# Patient Record
Sex: MALE | Race: BLACK OR AFRICAN AMERICAN | NOT HISPANIC OR LATINO | Employment: FULL TIME | URBAN - METROPOLITAN AREA
[De-identification: names, ages, dates, MRNs, and addresses within clinical notes are randomized per-mention and may not be internally consistent; named-entity substitution may affect disease eponyms.]

---

## 2023-05-31 DIAGNOSIS — I26.99 PULMONARY EMBOLISM, UNSPECIFIED CHRONICITY, UNSPECIFIED PULMONARY EMBOLISM TYPE, UNSPECIFIED WHETHER ACUTE COR PULMONALE PRESENT (MULTI): ICD-10-CM

## 2023-05-31 DIAGNOSIS — N40.1 BENIGN PROSTATIC HYPERPLASIA WITH LOWER URINARY TRACT SYMPTOMS, SYMPTOM DETAILS UNSPECIFIED: ICD-10-CM

## 2023-05-31 PROBLEM — N40.0 BPH (BENIGN PROSTATIC HYPERPLASIA): Status: ACTIVE | Noted: 2023-05-31

## 2023-05-31 RX ORDER — TAMSULOSIN HYDROCHLORIDE 0.4 MG/1
0.4 CAPSULE ORAL NIGHTLY
COMMUNITY
End: 2023-05-31 | Stop reason: SDUPTHER

## 2023-05-31 RX ORDER — RIVAROXABAN 10 MG/1
10 TABLET, FILM COATED ORAL DAILY
COMMUNITY
End: 2023-05-31 | Stop reason: SDUPTHER

## 2023-06-01 RX ORDER — RIVAROXABAN 10 MG/1
10 TABLET, FILM COATED ORAL DAILY
Qty: 30 TABLET | Refills: 0 | Status: SHIPPED | OUTPATIENT
Start: 2023-06-01 | End: 2023-07-18 | Stop reason: SDUPTHER

## 2023-06-01 RX ORDER — TAMSULOSIN HYDROCHLORIDE 0.4 MG/1
0.4 CAPSULE ORAL NIGHTLY
Qty: 30 CAPSULE | Refills: 0 | Status: SHIPPED | OUTPATIENT
Start: 2023-06-01 | End: 2023-07-18 | Stop reason: SDUPTHER

## 2023-07-18 ENCOUNTER — OFFICE VISIT (OUTPATIENT)
Dept: PRIMARY CARE | Facility: CLINIC | Age: 63
End: 2023-07-18
Payer: COMMERCIAL

## 2023-07-18 VITALS
DIASTOLIC BLOOD PRESSURE: 87 MMHG | HEART RATE: 74 BPM | RESPIRATION RATE: 18 BRPM | HEIGHT: 66 IN | WEIGHT: 171.4 LBS | SYSTOLIC BLOOD PRESSURE: 126 MMHG | OXYGEN SATURATION: 99 % | BODY MASS INDEX: 27.55 KG/M2

## 2023-07-18 DIAGNOSIS — E88.810 DYSMETABOLIC SYNDROME: ICD-10-CM

## 2023-07-18 DIAGNOSIS — I10 ESSENTIAL HYPERTENSION: ICD-10-CM

## 2023-07-18 DIAGNOSIS — E55.9 VITAMIN D DEFICIENCY: ICD-10-CM

## 2023-07-18 DIAGNOSIS — Z87.891 FORMER SMOKER: ICD-10-CM

## 2023-07-18 DIAGNOSIS — I26.99 PULMONARY EMBOLISM, UNSPECIFIED CHRONICITY, UNSPECIFIED PULMONARY EMBOLISM TYPE, UNSPECIFIED WHETHER ACUTE COR PULMONALE PRESENT (MULTI): Primary | ICD-10-CM

## 2023-07-18 DIAGNOSIS — R73.03 PREDIABETES: ICD-10-CM

## 2023-07-18 DIAGNOSIS — N40.0 BENIGN PROSTATIC HYPERPLASIA WITHOUT LOWER URINARY TRACT SYMPTOMS: ICD-10-CM

## 2023-07-18 DIAGNOSIS — E78.5 DYSLIPIDEMIA: ICD-10-CM

## 2023-07-18 DIAGNOSIS — N40.1 BENIGN PROSTATIC HYPERPLASIA WITH LOWER URINARY TRACT SYMPTOMS, SYMPTOM DETAILS UNSPECIFIED: ICD-10-CM

## 2023-07-18 DIAGNOSIS — R91.1 LUNG NODULE: ICD-10-CM

## 2023-07-18 PROBLEM — M19.012 ARTHRITIS OF SHOULDER REGION, LEFT: Status: ACTIVE | Noted: 2023-07-18

## 2023-07-18 PROBLEM — M51.369 LUMBAR DEGENERATIVE DISC DISEASE: Status: ACTIVE | Noted: 2023-07-18

## 2023-07-18 PROBLEM — N52.9 INABILITY TO ATTAIN ERECTION: Status: ACTIVE | Noted: 2023-07-18

## 2023-07-18 PROBLEM — R01.1 CARDIAC MURMUR: Status: ACTIVE | Noted: 2023-07-18

## 2023-07-18 PROBLEM — R07.89 ATYPICAL CHEST PAIN: Status: RESOLVED | Noted: 2023-07-18 | Resolved: 2023-07-18

## 2023-07-18 PROBLEM — M54.30 SCIATICA: Status: ACTIVE | Noted: 2023-07-18

## 2023-07-18 PROBLEM — N40.2 PROSTATE NODULE: Status: ACTIVE | Noted: 2023-07-18

## 2023-07-18 PROBLEM — F17.210 NICOTINE DEPENDENCE, CIGARETTES, UNCOMPLICATED: Status: RESOLVED | Noted: 2023-07-18 | Resolved: 2023-07-18

## 2023-07-18 PROBLEM — K92.1 BLACK STOOL: Status: RESOLVED | Noted: 2023-07-18 | Resolved: 2023-07-18

## 2023-07-18 PROBLEM — E04.9 GOITER: Status: ACTIVE | Noted: 2023-07-18

## 2023-07-18 PROBLEM — M75.81 TENDINITIS OF RIGHT ROTATOR CUFF: Status: RESOLVED | Noted: 2023-07-18 | Resolved: 2023-07-18

## 2023-07-18 PROBLEM — K59.00 CONSTIPATION: Status: RESOLVED | Noted: 2023-07-18 | Resolved: 2023-07-18

## 2023-07-18 PROBLEM — M51.36 LUMBAR DEGENERATIVE DISC DISEASE: Status: ACTIVE | Noted: 2023-07-18

## 2023-07-18 PROCEDURE — 3074F SYST BP LT 130 MM HG: CPT

## 2023-07-18 PROCEDURE — 3079F DIAST BP 80-89 MM HG: CPT

## 2023-07-18 PROCEDURE — 1036F TOBACCO NON-USER: CPT

## 2023-07-18 PROCEDURE — 99214 OFFICE O/P EST MOD 30 MIN: CPT

## 2023-07-18 RX ORDER — AMLODIPINE BESYLATE 5 MG/1
5 TABLET ORAL DAILY
Qty: 30 TABLET | Refills: 3 | Status: SHIPPED | OUTPATIENT
Start: 2023-07-18 | End: 2023-12-14 | Stop reason: SDUPTHER

## 2023-07-18 RX ORDER — ASPIRIN 325 MG
50000 TABLET, DELAYED RELEASE (ENTERIC COATED) ORAL
COMMUNITY

## 2023-07-18 RX ORDER — AMLODIPINE BESYLATE 5 MG/1
5 TABLET ORAL DAILY
COMMUNITY
End: 2023-07-18 | Stop reason: SDUPTHER

## 2023-07-18 RX ORDER — OLMESARTAN MEDOXOMIL 40 MG/1
40 TABLET ORAL DAILY
Qty: 30 TABLET | Refills: 3 | Status: SHIPPED | OUTPATIENT
Start: 2023-07-18 | End: 2024-05-23 | Stop reason: SDUPTHER

## 2023-07-18 RX ORDER — OLMESARTAN MEDOXOMIL 40 MG/1
40 TABLET ORAL DAILY
COMMUNITY
End: 2023-07-18 | Stop reason: SDUPTHER

## 2023-07-18 RX ORDER — TAMSULOSIN HYDROCHLORIDE 0.4 MG/1
0.4 CAPSULE ORAL NIGHTLY
Qty: 30 CAPSULE | Refills: 3 | Status: SHIPPED | OUTPATIENT
Start: 2023-07-18 | End: 2023-11-16 | Stop reason: SDUPTHER

## 2023-07-18 RX ORDER — RIVAROXABAN 10 MG/1
10 TABLET, FILM COATED ORAL DAILY
Qty: 30 TABLET | Refills: 3 | Status: SHIPPED | OUTPATIENT
Start: 2023-07-18 | End: 2023-10-19 | Stop reason: SDUPTHER

## 2023-07-18 ASSESSMENT — ENCOUNTER SYMPTOMS
DIZZINESS: 0
EYE DISCHARGE: 0
SORE THROAT: 0
DEPRESSION: 0
WEAKNESS: 0
GASTROINTESTINAL NEGATIVE: 1
CONSTITUTIONAL NEGATIVE: 1
PSYCHIATRIC NEGATIVE: 1
CARDIOVASCULAR NEGATIVE: 1
FATIGUE: 0
TREMORS: 0
JOINT SWELLING: 0
CHILLS: 0
DIFFICULTY URINATING: 0
POLYPHAGIA: 0
BRUISES/BLEEDS EASILY: 0
NECK STIFFNESS: 0
SINUS PRESSURE: 0
FEVER: 0
POLYDIPSIA: 0
CONFUSION: 0
OCCASIONAL FEELINGS OF UNSTEADINESS: 0
NECK PAIN: 0
DIARRHEA: 0
STRIDOR: 0
CHEST TIGHTNESS: 0
RESPIRATORY NEGATIVE: 1
SLEEP DISTURBANCE: 0
RECTAL PAIN: 0
BACK PAIN: 0
SPEECH DIFFICULTY: 0
PALPITATIONS: 0
LIGHT-HEADEDNESS: 0
CONSTIPATION: 0
HEMATOLOGIC/LYMPHATIC NEGATIVE: 1
NERVOUS/ANXIOUS: 0
NUMBNESS: 0
SHORTNESS OF BREATH: 0
DYSPHORIC MOOD: 0
APPETITE CHANGE: 0
TROUBLE SWALLOWING: 0
ANAL BLEEDING: 0
COUGH: 0
NEUROLOGICAL NEGATIVE: 1
ACTIVITY CHANGE: 0
LOSS OF SENSATION IN FEET: 0
EYES NEGATIVE: 1
HEADACHES: 0
UNEXPECTED WEIGHT CHANGE: 0
HYPERACTIVE: 0
COLOR CHANGE: 0
FLANK PAIN: 0
ENDOCRINE NEGATIVE: 1
PHOTOPHOBIA: 0
RHINORRHEA: 0
WHEEZING: 0
SEIZURES: 0
HEMATURIA: 0
BLOOD IN STOOL: 0
MYALGIAS: 0
VOICE CHANGE: 0
DIAPHORESIS: 0
MUSCULOSKELETAL NEGATIVE: 1
NAUSEA: 0
AGITATION: 0
ABDOMINAL DISTENTION: 0
ABDOMINAL PAIN: 0
FREQUENCY: 0
DYSURIA: 0
APNEA: 0

## 2023-07-18 ASSESSMENT — PATIENT HEALTH QUESTIONNAIRE - PHQ9
1. LITTLE INTEREST OR PLEASURE IN DOING THINGS: NOT AT ALL
SUM OF ALL RESPONSES TO PHQ9 QUESTIONS 1 AND 2: 0
2. FEELING DOWN, DEPRESSED OR HOPELESS: NOT AT ALL

## 2023-07-18 NOTE — PROGRESS NOTES
Primary Care Provider: MOODY George-CNP    Subjective   Mercedes Garner is a 63 y.o. male who presents for Establish Care.    PMH of hx of unprovoked PE with FH of DVT/ blood clots, BPH, HTN, ED, lung nodules- former smoker.    Medication RF    June 2020 had an unprovoked PE; FH of DVT/ blood clots. Saw Hematology who recommended after he completed xarelto 20mg daily for 1 year to xarelto 10mg daily indefinitely     Smoked 30 years 0.5-1PPD; lung nodule; quit about 1 year ago         Review of Systems   Constitutional: Negative.  Negative for activity change, appetite change, chills, diaphoresis, fatigue, fever and unexpected weight change.   HENT: Negative.  Negative for congestion, dental problem, ear discharge, ear pain, hearing loss, mouth sores, nosebleeds, postnasal drip, rhinorrhea, sinus pressure, sneezing, sore throat, tinnitus, trouble swallowing and voice change.    Eyes: Negative.  Negative for photophobia, discharge and visual disturbance.   Respiratory: Negative.  Negative for apnea, cough, chest tightness, shortness of breath, wheezing and stridor.    Cardiovascular: Negative.  Negative for chest pain, palpitations and leg swelling.   Gastrointestinal: Negative.  Negative for abdominal distention, abdominal pain, anal bleeding, blood in stool, constipation, diarrhea, nausea and rectal pain.   Endocrine: Negative.  Negative for cold intolerance, heat intolerance, polydipsia, polyphagia and polyuria.   Genitourinary: Negative.  Negative for decreased urine volume, difficulty urinating, dysuria, flank pain, frequency, hematuria and urgency.   Musculoskeletal: Negative.  Negative for back pain, gait problem, joint swelling, myalgias, neck pain and neck stiffness.   Skin: Negative.  Negative for color change and rash.   Neurological: Negative.  Negative for dizziness, tremors, seizures, syncope, speech difficulty, weakness, light-headedness, numbness and headaches.   Hematological: Negative.  Does  "not bruise/bleed easily.   Psychiatric/Behavioral: Negative.  Negative for agitation, confusion, dysphoric mood, sleep disturbance and suicidal ideas. The patient is not nervous/anxious and is not hyperactive.    All other systems reviewed and are negative.        Objective   /87   Pulse 74   Resp 18   Ht 1.676 m (5' 6\")   Wt 77.7 kg (171 lb 6.4 oz)   SpO2 99%   BMI 27.66 kg/m²     Physical Exam  Vitals reviewed.   Constitutional:       General: He is not in acute distress.     Appearance: Normal appearance. He is normal weight. He is not ill-appearing, toxic-appearing or diaphoretic.   HENT:      Head: Normocephalic and atraumatic.      Nose: Nose normal.   Eyes:      Extraocular Movements: Extraocular movements intact.      Conjunctiva/sclera: Conjunctivae normal.      Pupils: Pupils are equal, round, and reactive to light.   Neck:      Vascular: No carotid bruit.   Cardiovascular:      Rate and Rhythm: Normal rate and regular rhythm.      Pulses: Normal pulses.      Heart sounds: Normal heart sounds. No murmur heard.     No friction rub. No gallop.   Pulmonary:      Effort: Pulmonary effort is normal. No respiratory distress.      Breath sounds: Normal breath sounds.   Abdominal:      General: Abdomen is flat. Bowel sounds are normal.      Palpations: Abdomen is soft.   Musculoskeletal:         General: Normal range of motion.      Cervical back: Normal range of motion and neck supple.   Lymphadenopathy:      Cervical: No cervical adenopathy.   Skin:     General: Skin is warm and dry.      Capillary Refill: Capillary refill takes less than 2 seconds.   Neurological:      General: No focal deficit present.      Mental Status: He is alert and oriented to person, place, and time. Mental status is at baseline.   Psychiatric:         Mood and Affect: Mood normal.         Behavior: Behavior normal.         Thought Content: Thought content normal.         Judgment: Judgment normal.         Assessment/Plan "   Problem List Items Addressed This Visit       Pulmonary embolism (CMS/HCC) - Primary    Relevant Medications    Xarelto 10 mg tablet    Other Relevant Orders    Vitamin D 25-Hydroxy,Total    Hemoglobin A1C    Lipid Panel    Comprehensive metabolic panel    CBC    Urinalysis with Reflex Microscopic    PSA    Thyroid Stimulating Hormone    BPH (benign prostatic hyperplasia)    Relevant Medications    tamsulosin (Flomax) 0.4 mg 24 hr capsule    Other Relevant Orders    Vitamin D 25-Hydroxy,Total    Hemoglobin A1C    Lipid Panel    Comprehensive metabolic panel    CBC    Urinalysis with Reflex Microscopic    PSA    Thyroid Stimulating Hormone    Vitamin D 25-Hydroxy,Total    Hemoglobin A1C    Lipid Panel    Comprehensive metabolic panel    CBC    Urinalysis with Reflex Microscopic    PSA    Thyroid Stimulating Hormone    Dyslipidemia    Relevant Orders    Vitamin D 25-Hydroxy,Total    Hemoglobin A1C    Lipid Panel    Comprehensive metabolic panel    CBC    Urinalysis with Reflex Microscopic    PSA    Thyroid Stimulating Hormone    Dysmetabolic syndrome    Relevant Orders    Vitamin D 25-Hydroxy,Total    Hemoglobin A1C    Lipid Panel    Comprehensive metabolic panel    CBC    Urinalysis with Reflex Microscopic    PSA    Thyroid Stimulating Hormone    Essential hypertension    Relevant Medications    olmesartan (BENIcar) 40 mg tablet    amLODIPine (Norvasc) 5 mg tablet    Other Relevant Orders    Vitamin D 25-Hydroxy,Total    Hemoglobin A1C    Lipid Panel    Comprehensive metabolic panel    CBC    Urinalysis with Reflex Microscopic    PSA    Thyroid Stimulating Hormone    Lung nodule    Relevant Orders    CT lung screening low dose    Vitamin D 25-Hydroxy,Total    Hemoglobin A1C    Lipid Panel    Comprehensive metabolic panel    CBC    Urinalysis with Reflex Microscopic    PSA    Thyroid Stimulating Hormone    Prediabetes    Relevant Orders    Vitamin D 25-Hydroxy,Total    Hemoglobin A1C    Lipid Panel    Comprehensive  metabolic panel    CBC    Urinalysis with Reflex Microscopic    PSA    Thyroid Stimulating Hormone    Vitamin D deficiency    Relevant Orders    Vitamin D 25-Hydroxy,Total    Hemoglobin A1C    Lipid Panel    Comprehensive metabolic panel    CBC    Urinalysis with Reflex Microscopic    PSA    Thyroid Stimulating Hormone     Other Visit Diagnoses       Former smoker        Relevant Orders    CT lung screening low dose    Vitamin D 25-Hydroxy,Total    Hemoglobin A1C    Lipid Panel    Comprehensive metabolic panel    CBC    Urinalysis with Reflex Microscopic    PSA    Thyroid Stimulating Hormone          Follow up in 6 months for annual wellness exam or sooner as needed

## 2023-09-21 ENCOUNTER — LAB (OUTPATIENT)
Dept: LAB | Facility: LAB | Age: 63
End: 2023-09-21
Payer: COMMERCIAL

## 2023-09-21 DIAGNOSIS — I10 ESSENTIAL HYPERTENSION: ICD-10-CM

## 2023-09-21 DIAGNOSIS — E78.5 DYSLIPIDEMIA: ICD-10-CM

## 2023-09-21 DIAGNOSIS — E88.810 DYSMETABOLIC SYNDROME: ICD-10-CM

## 2023-09-21 DIAGNOSIS — I26.99 PULMONARY EMBOLISM, UNSPECIFIED CHRONICITY, UNSPECIFIED PULMONARY EMBOLISM TYPE, UNSPECIFIED WHETHER ACUTE COR PULMONALE PRESENT (MULTI): ICD-10-CM

## 2023-09-21 DIAGNOSIS — E55.9 VITAMIN D DEFICIENCY: ICD-10-CM

## 2023-09-21 DIAGNOSIS — N40.1 BENIGN PROSTATIC HYPERPLASIA WITH LOWER URINARY TRACT SYMPTOMS, SYMPTOM DETAILS UNSPECIFIED: ICD-10-CM

## 2023-09-21 DIAGNOSIS — R73.03 PREDIABETES: ICD-10-CM

## 2023-09-21 DIAGNOSIS — Z87.891 FORMER SMOKER: ICD-10-CM

## 2023-09-21 DIAGNOSIS — N40.0 BENIGN PROSTATIC HYPERPLASIA WITHOUT LOWER URINARY TRACT SYMPTOMS: ICD-10-CM

## 2023-09-21 DIAGNOSIS — R91.1 LUNG NODULE: ICD-10-CM

## 2023-09-21 LAB
ALANINE AMINOTRANSFERASE (SGPT) (U/L) IN SER/PLAS: 24 U/L (ref 10–52)
ALBUMIN (G/DL) IN SER/PLAS: 4.3 G/DL (ref 3.4–5)
ALKALINE PHOSPHATASE (U/L) IN SER/PLAS: 58 U/L (ref 33–136)
ANION GAP IN SER/PLAS: 11 MMOL/L (ref 10–20)
APPEARANCE, URINE: CLEAR
ASPARTATE AMINOTRANSFERASE (SGOT) (U/L) IN SER/PLAS: 23 U/L (ref 9–39)
BILIRUBIN TOTAL (MG/DL) IN SER/PLAS: 0.7 MG/DL (ref 0–1.2)
BILIRUBIN, URINE: NEGATIVE
BLOOD, URINE: ABNORMAL
CALCIDIOL (25 OH VITAMIN D3) (NG/ML) IN SER/PLAS: 29 NG/ML
CALCIUM (MG/DL) IN SER/PLAS: 8.9 MG/DL (ref 8.6–10.3)
CARBON DIOXIDE, TOTAL (MMOL/L) IN SER/PLAS: 26 MMOL/L (ref 21–32)
CHLORIDE (MMOL/L) IN SER/PLAS: 104 MMOL/L (ref 98–107)
CHOLESTEROL (MG/DL) IN SER/PLAS: 225 MG/DL (ref 0–199)
CHOLESTEROL IN HDL (MG/DL) IN SER/PLAS: 63.4 MG/DL
CHOLESTEROL/HDL RATIO: 3.5
COLOR, URINE: YELLOW
CREATININE (MG/DL) IN SER/PLAS: 0.8 MG/DL (ref 0.5–1.3)
ERYTHROCYTE DISTRIBUTION WIDTH (RATIO) BY AUTOMATED COUNT: 12.4 % (ref 11.5–14.5)
ERYTHROCYTE MEAN CORPUSCULAR HEMOGLOBIN CONCENTRATION (G/DL) BY AUTOMATED: 34.8 G/DL (ref 32–36)
ERYTHROCYTE MEAN CORPUSCULAR VOLUME (FL) BY AUTOMATED COUNT: 100 FL (ref 80–100)
ERYTHROCYTES (10*6/UL) IN BLOOD BY AUTOMATED COUNT: 4.23 X10E12/L (ref 4.5–5.9)
ESTIMATED AVERAGE GLUCOSE FOR HBA1C: 123 MG/DL
GFR MALE: >90 ML/MIN/1.73M2
GLUCOSE (MG/DL) IN SER/PLAS: 116 MG/DL (ref 74–99)
GLUCOSE, URINE: NEGATIVE MG/DL
HEMATOCRIT (%) IN BLOOD BY AUTOMATED COUNT: 42.2 % (ref 41–52)
HEMOGLOBIN (G/DL) IN BLOOD: 14.7 G/DL (ref 13.5–17.5)
HEMOGLOBIN A1C/HEMOGLOBIN TOTAL IN BLOOD: 5.9 %
KETONES, URINE: NEGATIVE MG/DL
LDL: 142 MG/DL (ref 0–99)
LEUKOCYTE ESTERASE, URINE: NEGATIVE
LEUKOCYTES (10*3/UL) IN BLOOD BY AUTOMATED COUNT: 5.7 X10E9/L (ref 4.4–11.3)
MUCUS, URINE: NORMAL /LPF
NITRITE, URINE: NEGATIVE
PH, URINE: 6 (ref 5–8)
PLATELETS (10*3/UL) IN BLOOD AUTOMATED COUNT: 281 X10E9/L (ref 150–450)
POTASSIUM (MMOL/L) IN SER/PLAS: 4.4 MMOL/L (ref 3.5–5.3)
PROSTATE SPECIFIC AG (NG/ML) IN SER/PLAS: 0.27 NG/ML (ref 0–4)
PROTEIN TOTAL: 6.9 G/DL (ref 6.4–8.2)
PROTEIN, URINE: NEGATIVE MG/DL
RBC, URINE: 3 /HPF (ref 0–5)
SODIUM (MMOL/L) IN SER/PLAS: 137 MMOL/L (ref 136–145)
SPECIFIC GRAVITY, URINE: 1.01 (ref 1–1.03)
THYROTROPIN (MIU/L) IN SER/PLAS BY DETECTION LIMIT <= 0.05 MIU/L: 1.15 MIU/L (ref 0.44–3.98)
TRIGLYCERIDE (MG/DL) IN SER/PLAS: 97 MG/DL (ref 0–149)
UREA NITROGEN (MG/DL) IN SER/PLAS: 7 MG/DL (ref 6–23)
UROBILINOGEN, URINE: <2 MG/DL (ref 0–1.9)
VLDL: 19 MG/DL (ref 0–40)
WBC, URINE: NORMAL /HPF (ref 0–5)

## 2023-09-21 PROCEDURE — 84153 ASSAY OF PSA TOTAL: CPT

## 2023-09-21 PROCEDURE — 36415 COLL VENOUS BLD VENIPUNCTURE: CPT

## 2023-09-21 PROCEDURE — 85027 COMPLETE CBC AUTOMATED: CPT

## 2023-09-21 PROCEDURE — 81001 URINALYSIS AUTO W/SCOPE: CPT

## 2023-09-21 PROCEDURE — 83036 HEMOGLOBIN GLYCOSYLATED A1C: CPT

## 2023-09-21 PROCEDURE — 80061 LIPID PANEL: CPT

## 2023-09-21 PROCEDURE — 80053 COMPREHEN METABOLIC PANEL: CPT

## 2023-09-21 PROCEDURE — 84443 ASSAY THYROID STIM HORMONE: CPT

## 2023-09-21 PROCEDURE — 82306 VITAMIN D 25 HYDROXY: CPT

## 2023-09-22 DIAGNOSIS — R73.03 PREDIABETES: Primary | ICD-10-CM

## 2023-09-22 DIAGNOSIS — E78.5 DYSLIPIDEMIA: ICD-10-CM

## 2023-09-22 RX ORDER — ATORVASTATIN CALCIUM 40 MG/1
40 TABLET, FILM COATED ORAL DAILY
Qty: 90 TABLET | Refills: 3 | Status: SHIPPED | OUTPATIENT
Start: 2023-09-22 | End: 2024-05-23 | Stop reason: SDUPTHER

## 2023-09-25 ENCOUNTER — TELEPHONE (OUTPATIENT)
Dept: PRIMARY CARE | Facility: CLINIC | Age: 63
End: 2023-09-25
Payer: COMMERCIAL

## 2023-09-25 NOTE — TELEPHONE ENCOUNTER
--Elevated cholesterol- would like him to start on a medication to lower his cholesterol to lower his risk of heart attack and stroke. Reduce red meat, cheese, and sweets. Increase physical activity. --Elevated Blood sugars- increase physical activity and reduce sweets, breads, and pastas --Vitamin D low; can take OTC vitamin D 1,000 I U daily --The remainder of his labs looked good

## 2023-10-19 DIAGNOSIS — I26.99 PULMONARY EMBOLISM, UNSPECIFIED CHRONICITY, UNSPECIFIED PULMONARY EMBOLISM TYPE, UNSPECIFIED WHETHER ACUTE COR PULMONALE PRESENT (MULTI): ICD-10-CM

## 2023-10-20 RX ORDER — RIVAROXABAN 10 MG/1
10 TABLET, FILM COATED ORAL DAILY
Qty: 90 TABLET | Refills: 1 | Status: SHIPPED | OUTPATIENT
Start: 2023-10-20 | End: 2024-05-23 | Stop reason: SDUPTHER

## 2023-11-16 DIAGNOSIS — N40.1 BENIGN PROSTATIC HYPERPLASIA WITH LOWER URINARY TRACT SYMPTOMS, SYMPTOM DETAILS UNSPECIFIED: ICD-10-CM

## 2023-11-17 RX ORDER — TAMSULOSIN HYDROCHLORIDE 0.4 MG/1
0.4 CAPSULE ORAL NIGHTLY
Qty: 30 CAPSULE | Refills: 3 | Status: SHIPPED | OUTPATIENT
Start: 2023-11-17 | End: 2024-05-23 | Stop reason: SDUPTHER

## 2023-12-04 ENCOUNTER — ANCILLARY PROCEDURE (OUTPATIENT)
Dept: RADIOLOGY | Facility: CLINIC | Age: 63
End: 2023-12-04
Payer: COMMERCIAL

## 2023-12-04 DIAGNOSIS — Z87.891 FORMER SMOKER: ICD-10-CM

## 2023-12-04 DIAGNOSIS — R91.1 LUNG NODULE: ICD-10-CM

## 2023-12-04 PROCEDURE — 71271 CT THORAX LUNG CANCER SCR C-: CPT | Performed by: RADIOLOGY

## 2023-12-04 PROCEDURE — 71271 CT THORAX LUNG CANCER SCR C-: CPT

## 2023-12-05 ENCOUNTER — TELEPHONE (OUTPATIENT)
Dept: PRIMARY CARE | Facility: CLINIC | Age: 63
End: 2023-12-05
Payer: COMMERCIAL

## 2023-12-05 NOTE — TELEPHONE ENCOUNTER
Per ALAN Latonia... Informed Patient CT scan was stable. Repeat CT low dose lung cancer screening in 1 year.

## 2023-12-14 DIAGNOSIS — I10 ESSENTIAL HYPERTENSION: ICD-10-CM

## 2023-12-15 RX ORDER — AMLODIPINE BESYLATE 5 MG/1
5 TABLET ORAL DAILY
Qty: 30 TABLET | Refills: 3 | Status: SHIPPED | OUTPATIENT
Start: 2023-12-15 | End: 2024-04-29 | Stop reason: SDUPTHER

## 2023-12-18 ENCOUNTER — HOSPITAL ENCOUNTER (OUTPATIENT)
Dept: RADIOLOGY | Facility: HOSPITAL | Age: 63
Discharge: HOME | End: 2023-12-18
Payer: COMMERCIAL

## 2023-12-18 ENCOUNTER — OFFICE VISIT (OUTPATIENT)
Dept: PRIMARY CARE | Facility: CLINIC | Age: 63
End: 2023-12-18
Payer: COMMERCIAL

## 2023-12-18 VITALS
WEIGHT: 172.6 LBS | RESPIRATION RATE: 17 BRPM | SYSTOLIC BLOOD PRESSURE: 122 MMHG | BODY MASS INDEX: 27.09 KG/M2 | HEART RATE: 84 BPM | HEIGHT: 67 IN | DIASTOLIC BLOOD PRESSURE: 70 MMHG

## 2023-12-18 DIAGNOSIS — M54.16 LUMBAR BACK PAIN WITH RADICULOPATHY AFFECTING RIGHT LOWER EXTREMITY: Primary | ICD-10-CM

## 2023-12-18 DIAGNOSIS — Z23 INFLUENZA VACCINATION GIVEN: ICD-10-CM

## 2023-12-18 DIAGNOSIS — M54.16 LUMBAR BACK PAIN WITH RADICULOPATHY AFFECTING RIGHT LOWER EXTREMITY: ICD-10-CM

## 2023-12-18 PROCEDURE — 99214 OFFICE O/P EST MOD 30 MIN: CPT

## 2023-12-18 PROCEDURE — 1036F TOBACCO NON-USER: CPT

## 2023-12-18 PROCEDURE — 3078F DIAST BP <80 MM HG: CPT

## 2023-12-18 PROCEDURE — 90471 IMMUNIZATION ADMIN: CPT

## 2023-12-18 PROCEDURE — 72110 X-RAY EXAM L-2 SPINE 4/>VWS: CPT

## 2023-12-18 PROCEDURE — 72110 X-RAY EXAM L-2 SPINE 4/>VWS: CPT | Performed by: RADIOLOGY

## 2023-12-18 PROCEDURE — 3074F SYST BP LT 130 MM HG: CPT

## 2023-12-18 PROCEDURE — 90686 IIV4 VACC NO PRSV 0.5 ML IM: CPT

## 2023-12-18 RX ORDER — METHYLPREDNISOLONE 4 MG/1
TABLET ORAL
Qty: 21 TABLET | Refills: 0 | Status: SHIPPED | OUTPATIENT
Start: 2023-12-18 | End: 2023-12-25

## 2023-12-18 RX ORDER — TIZANIDINE HYDROCHLORIDE 4 MG/1
4 CAPSULE, GELATIN COATED ORAL NIGHTLY PRN
Qty: 14 CAPSULE | Refills: 0 | Status: SHIPPED | OUTPATIENT
Start: 2023-12-18 | End: 2024-05-23 | Stop reason: ALTCHOICE

## 2023-12-18 ASSESSMENT — ENCOUNTER SYMPTOMS
TROUBLE SWALLOWING: 0
CHILLS: 0
SORE THROAT: 0
ARTHRALGIAS: 1
RESPIRATORY NEGATIVE: 1
UNEXPECTED WEIGHT CHANGE: 0
BLOOD IN STOOL: 0
NECK STIFFNESS: 0
PHOTOPHOBIA: 0
GASTROINTESTINAL NEGATIVE: 1
LIGHT-HEADEDNESS: 0
CONFUSION: 0
ABDOMINAL DISTENTION: 0
FLANK PAIN: 0
POLYPHAGIA: 0
RECTAL PAIN: 0
ABDOMINAL PAIN: 0
SPEECH DIFFICULTY: 0
WHEEZING: 0
SLEEP DISTURBANCE: 0
EYES NEGATIVE: 1
WEAKNESS: 0
COLOR CHANGE: 0
DYSPHORIC MOOD: 0
DIAPHORESIS: 0
RHINORRHEA: 0
PSYCHIATRIC NEGATIVE: 1
NERVOUS/ANXIOUS: 0
DIARRHEA: 0
FATIGUE: 0
HEMATURIA: 0
ENDOCRINE NEGATIVE: 1
BRUISES/BLEEDS EASILY: 0
HEMATOLOGIC/LYMPHATIC NEGATIVE: 1
SINUS PRESSURE: 0
NAUSEA: 0
STRIDOR: 0
VOICE CHANGE: 0
BACK PAIN: 1
POLYDIPSIA: 0
SHORTNESS OF BREATH: 0
TREMORS: 0
CARDIOVASCULAR NEGATIVE: 1
ACTIVITY CHANGE: 0
EYE DISCHARGE: 0
DIZZINESS: 0
PALPITATIONS: 0
AGITATION: 0
ANAL BLEEDING: 0
APNEA: 0
CONSTIPATION: 0
NECK PAIN: 0
DIFFICULTY URINATING: 0
SEIZURES: 0
HYPERACTIVE: 0
APPETITE CHANGE: 0
DYSURIA: 0
HEADACHES: 0
CONSTITUTIONAL NEGATIVE: 1
COUGH: 0
FREQUENCY: 0
NUMBNESS: 1
FEVER: 0
MYALGIAS: 0
JOINT SWELLING: 0
CHEST TIGHTNESS: 0

## 2023-12-18 NOTE — PROGRESS NOTES
Primary Care Provider: MOODY George-CNP    Subjective   Mercedes Garner is a 63 y.o. male who presents for No chief complaint on file..    PMH of hx of unprovoked PE with FH of DVT/ blood clots, BPH, HTN, ED, lung nodules- former smoker.    4-5 weeks ago started having very painful low back pain with radiation down right leg pain with numbness and tingling; will get a popping sound with back extension that does reduce the pain temporarily; taking Aleve that helps the pain some. Pain worse with sitting. Pain does get better with light walking. Sleeps on his stomach at night or on his side- no pillow with sleeping on his side. Had an XR or lumbar spine 5/2021 with Mild facet disease and spondylosis worse in the lower lumbar spine.  Denies any chest pain, shortness of breath, trouble urinating, changes in bowel movements, or saddle paraesthesias.     Influenza vaccine today           Review of Systems   Constitutional: Negative.  Negative for activity change, appetite change, chills, diaphoresis, fatigue, fever and unexpected weight change.   HENT: Negative.  Negative for congestion, dental problem, ear discharge, ear pain, hearing loss, mouth sores, nosebleeds, postnasal drip, rhinorrhea, sinus pressure, sneezing, sore throat, tinnitus, trouble swallowing and voice change.    Eyes: Negative.  Negative for photophobia, discharge and visual disturbance.   Respiratory: Negative.  Negative for apnea, cough, chest tightness, shortness of breath, wheezing and stridor.    Cardiovascular: Negative.  Negative for chest pain, palpitations and leg swelling.   Gastrointestinal: Negative.  Negative for abdominal distention, abdominal pain, anal bleeding, blood in stool, constipation, diarrhea, nausea and rectal pain.   Endocrine: Negative.  Negative for cold intolerance, heat intolerance, polydipsia, polyphagia and polyuria.   Genitourinary: Negative.  Negative for decreased urine volume, difficulty urinating, dysuria,  "flank pain, frequency, hematuria and urgency.   Musculoskeletal:  Positive for arthralgias and back pain. Negative for gait problem, joint swelling, myalgias, neck pain and neck stiffness.   Skin: Negative.  Negative for color change and rash.   Neurological:  Positive for numbness. Negative for dizziness, tremors, seizures, syncope, speech difficulty, weakness, light-headedness and headaches.   Hematological: Negative.  Does not bruise/bleed easily.   Psychiatric/Behavioral: Negative.  Negative for agitation, confusion, dysphoric mood, sleep disturbance and suicidal ideas. The patient is not nervous/anxious and is not hyperactive.    All other systems reviewed and are negative.        Objective   /70   Pulse 84   Resp 17   Ht 1.702 m (5' 7\")   Wt 78.3 kg (172 lb 9.6 oz)   BMI 27.03 kg/m²     Physical Exam  Vitals reviewed.   Constitutional:       General: He is not in acute distress.     Appearance: Normal appearance. He is normal weight. He is not ill-appearing, toxic-appearing or diaphoretic.   HENT:      Head: Normocephalic and atraumatic.      Nose: Nose normal.   Eyes:      Extraocular Movements: Extraocular movements intact.      Conjunctiva/sclera: Conjunctivae normal.      Pupils: Pupils are equal, round, and reactive to light.   Neck:      Vascular: No carotid bruit.   Cardiovascular:      Rate and Rhythm: Normal rate and regular rhythm.      Pulses: Normal pulses.      Heart sounds: Normal heart sounds. No murmur heard.     No friction rub. No gallop.   Pulmonary:      Effort: Pulmonary effort is normal. No respiratory distress.      Breath sounds: Normal breath sounds.   Abdominal:      General: Abdomen is flat. Bowel sounds are normal.      Palpations: Abdomen is soft.   Musculoskeletal:         General: Normal range of motion.      Cervical back: Normal range of motion and neck supple.      Comments: Pain with back extension; positive right SLR     Lymphadenopathy:      Cervical: No cervical " adenopathy.   Skin:     General: Skin is warm and dry.      Capillary Refill: Capillary refill takes less than 2 seconds.   Neurological:      General: No focal deficit present.      Mental Status: He is alert and oriented to person, place, and time. Mental status is at baseline.   Psychiatric:         Mood and Affect: Mood normal.         Behavior: Behavior normal.         Thought Content: Thought content normal.         Judgment: Judgment normal.         Assessment/Plan   Problem List Items Addressed This Visit             ICD-10-CM    Lumbar back pain with radiculopathy affecting right lower extremity - Primary M54.16    Relevant Medications    methylPREDNISolone (Medrol Dospak) 4 mg tablets    tiZANidine (Zanaflex) 4 mg capsule    Other Relevant Orders    Referral to Physical Therapy    XR lumbar spine complete 4+ views    Influenza vaccination given Z23    Relevant Orders    Flu vaccine (IIV4) age 6 months and greater, preservative free       Influenza vaccine today        Follow up in 2-3 months or sooner if needed

## 2023-12-22 ENCOUNTER — TELEPHONE (OUTPATIENT)
Dept: PRIMARY CARE | Facility: CLINIC | Age: 63
End: 2023-12-22
Payer: COMMERCIAL

## 2023-12-22 NOTE — TELEPHONE ENCOUNTER
Per ALAN Lincoln... Informed Patient XR of low back showed degenerative changes and mild disc bulging. Would like him to complete the steroid taper, take the muscle relaxant and as needed, and to please schedule the physical therapy appointment. He can also ice his low back as well

## 2024-01-22 NOTE — PROGRESS NOTES
Physical Therapy    Physical Therapy Evaluation and Treatment      Patient Name: Mercedes Garner  MRN: [id number]  Today's Date: 1/23/24  Time Calculation  Start Time: 1015  Stop Time: 1110  Time Calculation (min): 55 min    Assessment:    Patient presents with clinical signs and symptoms consistent with lumbar disc derangement with bilat LE radicular symptoms R > L  in the L 5 and S1 dermatomal distribution.  These impairments affect ADLs, work, recreational, exercise, transfer, ambulation, lift/carry, and sleep function that requires skilled PT intervention to resolve and enable patient to return to previous level of function. Factors that may affect progress in PT are chronic pain, medical co-morbidities,  work task strain, , and patient compliance. Patient is an  extension/stabilization program candidate.  Initial treatment of      Plan:  OP PT Plan  Treatment/Interventions: Cryotherapy, Dry needling, Education/ Instruction, Hot pack, Manual therapy, Neuromuscular re-education, Self care/ home management, Therapeutic activities, Therapeutic exercises, Taping techniques  PT Plan: Skilled PT  PT Frequency: 1 time per week  Duration: 12  Onset Date: 10/01/23  Certification Period Start Date: 01/23/24  Certification Period End Date: 04/22/24  Rehab Potential: Good  Plan of Care Agreement: Patient    Current Problem:   1. Sciatica of right side        2. Lumbar back pain with radiculopathy affecting right lower extremity  Referral to Physical Therapy          Subjective    Mercedes Garner notes original onset of LBP with R and L  LE radicular symptom in L5 and S1 distribution several years ago. His recent residential and reduced activity level and sitting seems to have increased this familiar symptom . He seems to be getting better the last  1 month with less severe symptom. He recently retired from his job as . He can elimiinate the LE symptom usually with walking and self spinal manipulation.     Pain:   LBP  3/10  R LE > L LE 4/10  Home Living:   Lives with spouse 2 story   Prior Level of Function:  Prior Function Per Pt/Caregiver Report  Hand Dominance: Rightindependent ADLs , works around the pain but sitting is worse activity      Objective   Cognition:   A+Ox3  Observation:   L scapula 2 thumb breadths higher than R with tender L levator origin  Hip joint clearance: PROM  Flexion  R full, L full ,  IR   R 15 deg restricted P R hip posterior  L  full ,   ER  R  full  L 75% ,  Extension  R  full  L full    Lumbar AROM in standing:    Flex   75%  fingers to midshin , Extension   90 % feels better  ,   Sidebending  R 90 %  L 90 %  ,        Centralizes    Myotomal Strength:  L3   R 5/5   L 5/5,  L4  R 5 /5  L 5 /5, L5 R  4/5  L 3+ /5,  S1   R  4/5  L  3+/5    Hip Strength:  Abduction   R  /5  L  /5,   Extension  R   /5    L  /5 ,  Flexion R   /5   L  /5 NT    Reflex: knee jerk   R  L  ,  Achilles  R  L NT    Sensation: Reduced    L5 dermatomal distribution  R  ( gr toe)  Accessory joint mobility: PA glide hypomobile   L4-5, L5-S1    SI joint: Gaenslen's maneuver   anterior innominate  R  L  posterior innominate  R  L   upslip  R  L downslip  R  L NT    Palpation:     + trigger points bilat QL    Gait: WNL    Special Tests:   squat strategy    knee dominant    knee stiffness, SLR + L at 50 deg  + R 65 deg      Outcome Measures:  Oswestry score    Treatments:  Prone prop   Lumbar extension 1x10 with manual L4-5 PA pressure    EDUCATION:   extensive education regarding mechanism of injury, relevant functional anatomy, treatment program rational, self management, HEP, and POC   Access Code: [email or web address]  URL: [email or web address]  Date: 01/23/2024  Prepared by: Neville Sanford    Exercises  - Prone Press Up  - 3 x daily - 7 x weekly - 3 sets - 10 reps  - Static Prone on Elbows  - 3 x daily - 7 x weekly - 2 min hold    Goals:

## 2024-01-23 ENCOUNTER — EVALUATION (OUTPATIENT)
Dept: PHYSICAL THERAPY | Facility: CLINIC | Age: 64
End: 2024-01-23
Payer: COMMERCIAL

## 2024-01-23 DIAGNOSIS — M54.16 LUMBAR BACK PAIN WITH RADICULOPATHY AFFECTING RIGHT LOWER EXTREMITY: ICD-10-CM

## 2024-01-23 DIAGNOSIS — M54.31 SCIATICA OF RIGHT SIDE: Primary | ICD-10-CM

## 2024-01-23 PROCEDURE — 97161 PT EVAL LOW COMPLEX 20 MIN: CPT | Mod: GP | Performed by: PHYSICAL THERAPIST

## 2024-01-23 PROCEDURE — 97110 THERAPEUTIC EXERCISES: CPT | Mod: GP | Performed by: PHYSICAL THERAPIST

## 2024-01-23 PROCEDURE — 97535 SELF CARE MNGMENT TRAINING: CPT | Mod: GP | Performed by: PHYSICAL THERAPIST

## 2024-02-21 ENCOUNTER — APPOINTMENT (OUTPATIENT)
Dept: PHYSICAL THERAPY | Facility: CLINIC | Age: 64
End: 2024-02-21
Payer: COMMERCIAL

## 2024-04-29 DIAGNOSIS — I10 ESSENTIAL HYPERTENSION: ICD-10-CM

## 2024-05-01 RX ORDER — AMLODIPINE BESYLATE 5 MG/1
5 TABLET ORAL DAILY
Qty: 30 TABLET | Refills: 0 | Status: SHIPPED | OUTPATIENT
Start: 2024-05-01 | End: 2024-05-23 | Stop reason: SDUPTHER

## 2024-05-23 ENCOUNTER — OFFICE VISIT (OUTPATIENT)
Dept: PRIMARY CARE | Facility: HOSPITAL | Age: 64
End: 2024-05-23
Payer: COMMERCIAL

## 2024-05-23 ENCOUNTER — PATIENT MESSAGE (OUTPATIENT)
Dept: PRIMARY CARE | Facility: CLINIC | Age: 64
End: 2024-05-23

## 2024-05-23 VITALS
BODY MASS INDEX: 26.21 KG/M2 | WEIGHT: 167 LBS | HEIGHT: 67 IN | SYSTOLIC BLOOD PRESSURE: 123 MMHG | DIASTOLIC BLOOD PRESSURE: 78 MMHG | HEART RATE: 70 BPM

## 2024-05-23 DIAGNOSIS — E55.9 VITAMIN D DEFICIENCY: ICD-10-CM

## 2024-05-23 DIAGNOSIS — N52.9 ERECTILE DYSFUNCTION, UNSPECIFIED ERECTILE DYSFUNCTION TYPE: ICD-10-CM

## 2024-05-23 DIAGNOSIS — I26.93 SINGLE SUBSEGMENTAL PULMONARY EMBOLISM WITHOUT ACUTE COR PULMONALE (MULTI): ICD-10-CM

## 2024-05-23 DIAGNOSIS — E88.819 INSULIN RESISTANCE: ICD-10-CM

## 2024-05-23 DIAGNOSIS — E78.5 DYSLIPIDEMIA: ICD-10-CM

## 2024-05-23 DIAGNOSIS — N40.1 BENIGN PROSTATIC HYPERPLASIA WITH LOWER URINARY TRACT SYMPTOMS, SYMPTOM DETAILS UNSPECIFIED: ICD-10-CM

## 2024-05-23 DIAGNOSIS — Z86.010 PERSONAL HISTORY OF COLONIC POLYPS: ICD-10-CM

## 2024-05-23 DIAGNOSIS — I10 ESSENTIAL HYPERTENSION: Primary | ICD-10-CM

## 2024-05-23 DIAGNOSIS — E78.5 HYPERLIPIDEMIA, UNSPECIFIED HYPERLIPIDEMIA TYPE: ICD-10-CM

## 2024-05-23 DIAGNOSIS — I26.99 PULMONARY EMBOLISM, UNSPECIFIED CHRONICITY, UNSPECIFIED PULMONARY EMBOLISM TYPE, UNSPECIFIED WHETHER ACUTE COR PULMONALE PRESENT (MULTI): ICD-10-CM

## 2024-05-23 PROBLEM — Z83.2 FAMILY HISTORY OF HYPERCOAGULABILITY: Status: ACTIVE | Noted: 2024-05-23

## 2024-05-23 PROBLEM — Z86.0100 PERSONAL HISTORY OF COLONIC POLYPS: Status: ACTIVE | Noted: 2024-05-23

## 2024-05-23 PROBLEM — R91.8 PULMONARY NODULES: Status: ACTIVE | Noted: 2023-07-18

## 2024-05-23 PROCEDURE — 99215 OFFICE O/P EST HI 40 MIN: CPT | Performed by: INTERNAL MEDICINE

## 2024-05-23 PROCEDURE — 3074F SYST BP LT 130 MM HG: CPT | Performed by: INTERNAL MEDICINE

## 2024-05-23 PROCEDURE — 1036F TOBACCO NON-USER: CPT | Performed by: INTERNAL MEDICINE

## 2024-05-23 PROCEDURE — 3078F DIAST BP <80 MM HG: CPT | Performed by: INTERNAL MEDICINE

## 2024-05-23 RX ORDER — OLMESARTAN MEDOXOMIL 40 MG/1
40 TABLET ORAL DAILY
Qty: 90 TABLET | Refills: 3 | Status: SHIPPED | OUTPATIENT
Start: 2024-05-23

## 2024-05-23 RX ORDER — TADALAFIL 5 MG/1
5 TABLET ORAL DAILY
Qty: 90 TABLET | Refills: 3 | Status: SHIPPED | OUTPATIENT
Start: 2024-05-23

## 2024-05-23 RX ORDER — ATORVASTATIN CALCIUM 40 MG/1
40 TABLET, FILM COATED ORAL DAILY
Qty: 90 TABLET | Refills: 3 | Status: SHIPPED | OUTPATIENT
Start: 2024-05-23

## 2024-05-23 RX ORDER — AMLODIPINE BESYLATE 5 MG/1
5 TABLET ORAL DAILY
Qty: 90 TABLET | Refills: 3 | Status: SHIPPED | OUTPATIENT
Start: 2024-05-23

## 2024-05-23 RX ORDER — RIVAROXABAN 10 MG/1
10 TABLET, FILM COATED ORAL DAILY
Qty: 90 TABLET | Refills: 1 | Status: SHIPPED | OUTPATIENT
Start: 2024-05-23

## 2024-05-23 RX ORDER — TAMSULOSIN HYDROCHLORIDE 0.4 MG/1
0.4 CAPSULE ORAL NIGHTLY
Qty: 90 CAPSULE | Refills: 3 | Status: SHIPPED | OUTPATIENT
Start: 2024-05-23

## 2024-05-23 NOTE — PATIENT INSTRUCTIONS
Fasting blood work now     Schedule eye exam    Schedule colonoscopy     Exercise for 30 min 5d per week - Yoga ([place])    Plant based, whole food diet - 5 servings of fruits and veg per day, 35 grams of fiber   Avoid: red meat, processed meats (deli and cured meats), saturated fat, sugar    Movies on Entrisphere:  The Game Changers  Blue Zones  You Are What You Eat  What the Health  Dickens Over Lexy    Web Sites for Recipes:  Blue Zones  [place]ens Over Knives  Plant Strong   Nutritionfacts.org     Authors:  Dr. Jhonny Ochoa     Cookbooks:  The Get Healthy, Go Vegan Cookbook: 125 Easy and Delicious Recipes to Jump-Start Weight Loss and Help you Feel Great - Dr. Jhonny Nur's Cookbook for Reversing Diabetes:  150 Recipes Scientifically Proven to Reverse Diabetes Without Drugs - Dr. Jhonny Nur  The Prevent and Reverse Heart Disease Cookbook - Dr. Miquel Marshall   The [place] Study All-Star Collection:  Whole Food, Plant-Based Recipes from Your Favorite Vegan  - Dr. BARBARA Lawrence  How Not To Die - Dr. Franklin Bunch

## 2024-05-23 NOTE — ASSESSMENT & PLAN NOTE
3-month interval colonoscopy was recommended after last study.  7 polyps were removed.  Colonoscopy due now.  Order placed.

## 2024-05-23 NOTE — ASSESSMENT & PLAN NOTE
Mercedes has a personal history of blood clots and a family history of hypercoagulability.  He is maintained on 10 mg of Xarelto daily.  He is currently asymptomatic.

## 2024-05-23 NOTE — ASSESSMENT & PLAN NOTE
Blood pressure in good range.  Continue current medications.  Will check comprehensive metabolic panel now.  Eye exam encouraged.

## 2024-05-23 NOTE — ASSESSMENT & PLAN NOTE
PSA screening is up-to-date.  Symptoms improved on treatment with tamsulosin.  We discussed starting daily tadalafil to address ED and BPH.

## 2024-05-23 NOTE — PROGRESS NOTES
Chief Complaint   Patient presents with    [facility]         History Of Present Illness:    Mercedes Garner is a 63 y.o. male presenting to establish care, refill medications and update health maintenance.  Mercedes has a history of hypertension, hyperlipidemia, insulin resistance and BPH.  He is due to have his medications refilled.  He has a history of chronic back pain which limits his physical activity.  He retired a couple of years ago and is trying to exercise more.  He denies chest pain or shortness of breath with physical exertion.  His current activities are physical therapy exercises, stretching and yard work.  He has a history of BPH managed with tamsulosin.  He completed a prostate MRI for evaluation of a palpable prostate nodule on 10/3/2022.  His MRI showed only PI-RADS 2 findings.  His PSA is up-to-date.  He is a former smoker and quit 2 years ago.  His last lung cancer screening was completed on 12/4/2023.  He has a personal history of colon polyps and is not due for colonoscopy.  He believes that he had a tetanus booster sometime before he retired 2 years ago.  Tetanus boosters were given routinely at his job.      6/19/20 echocardiogram  CONCLUSIONS:   1. The left ventricular systolic function is normal with a 60-65% estimated ejection fraction.   2. There is no evidence of left ventricular hypertrophy.   3. RVSP within normal limits.   4. Normal RV size and function.    LDCT 12/4/23  IMPRESSION:  1. Stable benign small pulmonary nodules in the setting of underlying  smoking related lung disease. Advise ongoing annual low-dose lung  cancer screening CT.  <4mm nodules     10/3/22 MRI Prostate  PIRADS2 findings     7/22/20 US Thyroid   6/18/20 Abdominal US   3/5/19 CT calcium 0      Active Medical Problems:  Patient Active Problem List    Diagnosis Date Noted    Family history of hypercoagulability 05/23/2024    Insulin resistance 05/23/2024    Personal history of colonic polyps 05/23/2024    Lumbar  back pain with radiculopathy affecting right lower extremity 12/18/2023    Influenza vaccination given 12/18/2023    Cardiac murmur 07/18/2023    Hyperlipidemia 07/18/2023    Dysmetabolic syndrome 07/18/2023    Essential hypertension 07/18/2023    Goiter 07/18/2023    Inability to attain erection 07/18/2023    Lumbar degenerative disc disease 07/18/2023    Pulmonary nodules 07/18/2023    Prediabetes 07/18/2023    Prostate nodule 07/18/2023    Sciatica 07/18/2023    Vitamin D deficiency 07/18/2023    Arthritis of shoulder region, left 07/18/2023    Pulmonary embolus (Multi) 05/31/2023    BPH (benign prostatic hyperplasia) 05/31/2023       Past Medical History:  Past Medical History:   Diagnosis Date    Atypical chest pain 07/18/2023    Black stool 07/18/2023    BPH (benign prostatic hyperplasia)     Family history of hypercoagulability     Gastro-esophageal reflux disease without esophagitis 03/22/2021    GERD without esophagitis    Hyperlipidemia     Hypertension     Insulin resistance     Personal history of other specified conditions 03/22/2021    History of weight loss    Pulmonary embolus (Multi)     Pulmonary nodules        Past Surgical History:  Past Surgical History:   Procedure Laterality Date    ANTERIOR CRUCIATE LIGAMENT REPAIR  1996    sports related         Social History:  Social History     Tobacco Use    Smoking status: Never    Smokeless tobacco: Never    Tobacco comments:     Quit 2 years ago - 20-30 pk yr history.  On and off throughout adult life    Vaping Use    Vaping status: Never Used   Substance Use Topics    Alcohol use: Yes     Alcohol/week: 1.0 standard drink of alcohol     Types: 1 Cans of beer per week     Comment: 1 per day    Drug use: Never         Family History:  Family History   Problem Relation Name Age of Onset    Diabetes Mother      Hypertension Mother      Heart failure Mother      Blood clot Mother      Liver cancer Father      Diabetes Father      Hypertension Father       "No Known Problems Sister      Thyroid disease Daughter          thyroidectomy (materal side)    No Known Problems Daughter      No Known Problems Grandchild 4         [place]        Allergies:  Other    Outpatient Medications:    Current Outpatient Medications:     cholecalciferol (Vitamin D-3) 1,250 mcg (50,000 unit) capsule, Take 1 capsule (50,000 Units) by mouth every 14 (fourteen) days., Disp: , Rfl:     amLODIPine (Norvasc) 5 mg tablet, Take 1 tablet (5 mg) by mouth once daily., Disp: 90 tablet, Rfl: 3    atorvastatin (Lipitor) 40 mg tablet, Take 1 tablet (40 mg) by mouth once daily., Disp: 90 tablet, Rfl: 3    olmesartan (BENIcar) 40 mg tablet, Take 1 tablet (40 mg) by mouth once daily., Disp: 90 tablet, Rfl: 3    tamsulosin (Flomax) 0.4 mg 24 hr capsule, Take 1 capsule (0.4 mg) by mouth once daily at bedtime., Disp: 90 capsule, Rfl: 3    Xarelto 10 mg tablet, Take 1 tablet (10 mg) by mouth once daily., Disp: 90 tablet, Rfl: 1      Review of Systems:  Pertinent positives in review of systems outlined above.  Complete ROS otherwise negative.        Last Recorded Vitals:  Vitals:    05/23/24 0758   BP: 123/78   Pulse: 70   Weight: 75.8 kg (167 lb)   Height: 1.702 m (5' 7\")   Body mass index is 26.16 kg/m².        Physical Exam  HENT:      Mouth/Throat:      Pharynx: Oropharynx is clear.   Eyes:      Extraocular Movements: Extraocular movements intact.      Conjunctiva/sclera: Conjunctivae normal.      Pupils: Pupils are equal, round, and reactive to light.   Neck:      Vascular: No carotid bruit.   Cardiovascular:      Rate and Rhythm: Normal rate and regular rhythm.      Pulses: Normal pulses.      Heart sounds: Normal heart sounds.   Pulmonary:      Effort: Pulmonary effort is normal.      Breath sounds: Normal breath sounds.   Abdominal:      General: Abdomen is flat. Bowel sounds are normal.      Palpations: Abdomen is soft.   Musculoskeletal:      Right lower leg: No edema.      Left lower leg: No edema. "   Lymphadenopathy:      Cervical: No cervical adenopathy.   Neurological:      General: No focal deficit present.      Cranial Nerves: No cranial nerve deficit.        The 10-year ASCVD risk score (Manny CASTRO, et al., 2019) is: 13.5%    Values used to calculate the score:      Age: 63 years      Sex: Male      Is Non- : Yes      Diabetic: No      Tobacco smoker: No      Systolic Blood Pressure: 123 mmHg      Is BP treated: Yes      HDL Cholesterol: 63.4 mg/dL      Total Cholesterol: 225 mg/dL      Assessment/Plan   Problem List Items Addressed This Visit       Pulmonary embolus (Multi)     Mercedes has a personal history of blood clots and a family history of hypercoagulability.  He is maintained on 10 mg of Xarelto daily.  He is currently asymptomatic.         Relevant Medications    Xarelto 10 mg tablet    BPH (benign prostatic hyperplasia)     PSA screening is up-to-date.  Symptoms improved on treatment with tamsulosin.  We discussed starting daily tadalafil to address ED and BPH.         Relevant Medications    tamsulosin (Flomax) 0.4 mg 24 hr capsule    Hyperlipidemia     CT calcium score 0 in 2019.  Fasting lipids due now.  Discussed goal of non-HDL cholesterol less than 100 with respect to his history of hypertension and elevated blood sugar.         Relevant Medications    atorvastatin (Lipitor) 40 mg tablet    Other Relevant Orders    Comprehensive Metabolic Panel    Essential hypertension - Primary     Blood pressure in good range.  Continue current medications.  Will check comprehensive metabolic panel now.  Eye exam encouraged.         Relevant Medications    olmesartan (BENIcar) 40 mg tablet    amLODIPine (Norvasc) 5 mg tablet    Other Relevant Orders    Comprehensive Metabolic Panel    Vitamin D deficiency     25-hydroxy vitamin D level will be assessed now.           Relevant Orders    Vitamin D 25-Hydroxy,Total (for eval of Vitamin D levels)    Insulin resistance     Fasting blood  sugar and hemoglobin A1c due now.         Relevant Orders    Comprehensive Metabolic Panel    Hemoglobin A1C    Personal history of colonic polyps     3-month interval colonoscopy was recommended after last study.  7 polyps were removed.  Colonoscopy due now.  Order placed.         Relevant Orders    Colonoscopy Screening; High Risk Patient         A total of 60 minutes was spent reviewing the chart and recent testing and discussing plan of care.     Myles Oseguera MD

## 2024-05-23 NOTE — ASSESSMENT & PLAN NOTE
CT calcium score 0 in 2019.  Fasting lipids due now.  Discussed goal of non-HDL cholesterol less than 100 with respect to his history of hypertension and elevated blood sugar.

## 2024-05-30 DIAGNOSIS — Z12.11 COLON CANCER SCREENING: ICD-10-CM

## 2024-05-30 RX ORDER — POLYETHYLENE GLYCOL 3350, SODIUM SULFATE ANHYDROUS, SODIUM BICARBONATE, SODIUM CHLORIDE, POTASSIUM CHLORIDE 236; 22.74; 6.74; 5.86; 2.97 G/4L; G/4L; G/4L; G/4L; G/4L
POWDER, FOR SOLUTION ORAL
Qty: 4000 ML | Refills: 0 | Status: SHIPPED | OUTPATIENT
Start: 2024-05-30

## 2024-06-19 ENCOUNTER — LAB (OUTPATIENT)
Dept: LAB | Facility: LAB | Age: 64
End: 2024-06-19
Payer: COMMERCIAL

## 2024-06-19 DIAGNOSIS — E88.819 INSULIN RESISTANCE: ICD-10-CM

## 2024-06-19 DIAGNOSIS — I10 ESSENTIAL HYPERTENSION: ICD-10-CM

## 2024-06-19 DIAGNOSIS — E78.5 DYSLIPIDEMIA: ICD-10-CM

## 2024-06-19 DIAGNOSIS — E78.5 HYPERLIPIDEMIA, UNSPECIFIED HYPERLIPIDEMIA TYPE: ICD-10-CM

## 2024-06-19 DIAGNOSIS — E55.9 VITAMIN D DEFICIENCY: ICD-10-CM

## 2024-06-19 LAB
25(OH)D3 SERPL-MCNC: 40 NG/ML (ref 30–100)
ALBUMIN SERPL BCP-MCNC: 4.1 G/DL (ref 3.4–5)
ALP SERPL-CCNC: 50 U/L (ref 33–136)
ALT SERPL W P-5'-P-CCNC: 22 U/L (ref 10–52)
ANION GAP SERPL CALC-SCNC: 11 MMOL/L (ref 10–20)
AST SERPL W P-5'-P-CCNC: 22 U/L (ref 9–39)
BILIRUB SERPL-MCNC: 1 MG/DL (ref 0–1.2)
BUN SERPL-MCNC: 9 MG/DL (ref 6–23)
CALCIUM SERPL-MCNC: 8.8 MG/DL (ref 8.6–10.3)
CHLORIDE SERPL-SCNC: 103 MMOL/L (ref 98–107)
CHOLEST SERPL-MCNC: 106 MG/DL (ref 0–199)
CHOLESTEROL/HDL RATIO: 2
CO2 SERPL-SCNC: 28 MMOL/L (ref 21–32)
CREAT SERPL-MCNC: 0.77 MG/DL (ref 0.5–1.3)
EGFRCR SERPLBLD CKD-EPI 2021: >90 ML/MIN/1.73M*2
EST. AVERAGE GLUCOSE BLD GHB EST-MCNC: 117 MG/DL
GLUCOSE SERPL-MCNC: 88 MG/DL (ref 74–99)
HBA1C MFR BLD: 5.7 %
HDLC SERPL-MCNC: 54.3 MG/DL
LDLC SERPL CALC-MCNC: 41 MG/DL
NON HDL CHOLESTEROL: 52 MG/DL (ref 0–149)
POTASSIUM SERPL-SCNC: 4.5 MMOL/L (ref 3.5–5.3)
PROT SERPL-MCNC: 6.8 G/DL (ref 6.4–8.2)
SODIUM SERPL-SCNC: 137 MMOL/L (ref 136–145)
TRIGL SERPL-MCNC: 55 MG/DL (ref 0–149)
VLDL: 11 MG/DL (ref 0–40)

## 2024-06-19 PROCEDURE — 80053 COMPREHEN METABOLIC PANEL: CPT

## 2024-06-19 PROCEDURE — 36415 COLL VENOUS BLD VENIPUNCTURE: CPT

## 2024-06-19 PROCEDURE — 80061 LIPID PANEL: CPT

## 2024-06-19 PROCEDURE — 82306 VITAMIN D 25 HYDROXY: CPT

## 2024-06-19 PROCEDURE — 83036 HEMOGLOBIN GLYCOSYLATED A1C: CPT

## 2024-06-23 DIAGNOSIS — I10 ESSENTIAL HYPERTENSION: ICD-10-CM

## 2024-06-23 DIAGNOSIS — E78.5 DYSLIPIDEMIA: Primary | ICD-10-CM

## 2024-06-23 DIAGNOSIS — E88.819 INSULIN RESISTANCE: ICD-10-CM

## 2024-07-19 ENCOUNTER — APPOINTMENT (OUTPATIENT)
Dept: PRIMARY CARE | Facility: CLINIC | Age: 64
End: 2024-07-19
Payer: COMMERCIAL

## 2024-07-24 ENCOUNTER — TELEPHONE (OUTPATIENT)
Dept: PRIMARY CARE | Facility: CLINIC | Age: 64
End: 2024-07-24
Payer: COMMERCIAL

## 2024-07-24 NOTE — TELEPHONE ENCOUNTER
Spoke to patient today. Per Dr. Oseguera  can stop his Xarelto for two days prior before colonoscopy on 8/31/24.    S[employer] MA

## 2024-07-31 ENCOUNTER — APPOINTMENT (OUTPATIENT)
Dept: GASTROENTEROLOGY | Facility: EXTERNAL LOCATION | Age: 64
End: 2024-07-31
Payer: COMMERCIAL

## 2024-07-31 ENCOUNTER — LAB REQUISITION (OUTPATIENT)
Dept: LAB | Facility: HOSPITAL | Age: 64
End: 2024-07-31
Payer: COMMERCIAL

## 2024-07-31 DIAGNOSIS — Z86.010 PERSONAL HISTORY OF COLONIC POLYPS: ICD-10-CM

## 2024-07-31 DIAGNOSIS — D12.9 BENIGN NEOPLASM OF RECTUM AND ANAL CANAL: ICD-10-CM

## 2024-07-31 DIAGNOSIS — D12.8 BENIGN NEOPLASM OF RECTUM AND ANAL CANAL: ICD-10-CM

## 2024-07-31 DIAGNOSIS — D12.3 BENIGN NEOPLASM OF TRANSVERSE COLON: ICD-10-CM

## 2024-07-31 DIAGNOSIS — D12.4 BENIGN NEOPLASM OF DESCENDING COLON: ICD-10-CM

## 2024-07-31 DIAGNOSIS — Z12.11 SPECIAL SCREENING FOR MALIGNANT NEOPLASMS, COLON: Primary | ICD-10-CM

## 2024-07-31 PROCEDURE — 0753T DGTZ GLS MCRSCP SLD LEVEL IV: CPT

## 2024-07-31 PROCEDURE — 88305 TISSUE EXAM BY PATHOLOGIST: CPT

## 2024-07-31 PROCEDURE — 45385 COLONOSCOPY W/LESION REMOVAL: CPT | Performed by: INTERNAL MEDICINE

## 2024-08-06 LAB
LABORATORY COMMENT REPORT: NORMAL
PATH REPORT.FINAL DX SPEC: NORMAL
PATH REPORT.GROSS SPEC: NORMAL
PATH REPORT.MICROSCOPIC SPEC OTHER STN: NORMAL
PATH REPORT.RELEVANT HX SPEC: NORMAL
PATH REPORT.TOTAL CANCER: NORMAL

## 2024-10-15 ENCOUNTER — APPOINTMENT (OUTPATIENT)
Dept: PRIMARY CARE | Facility: CLINIC | Age: 64
End: 2024-10-15
Payer: COMMERCIAL

## 2024-10-15 VITALS
HEART RATE: 75 BPM | TEMPERATURE: 97.9 F | HEIGHT: 67 IN | WEIGHT: 164.4 LBS | SYSTOLIC BLOOD PRESSURE: 115 MMHG | DIASTOLIC BLOOD PRESSURE: 74 MMHG | BODY MASS INDEX: 25.8 KG/M2

## 2024-10-15 DIAGNOSIS — I26.93 SINGLE SUBSEGMENTAL PULMONARY EMBOLISM WITHOUT ACUTE COR PULMONALE: ICD-10-CM

## 2024-10-15 DIAGNOSIS — Z86.0100 HISTORY OF COLONIC POLYPS: ICD-10-CM

## 2024-10-15 DIAGNOSIS — E78.5 HYPERLIPIDEMIA, UNSPECIFIED HYPERLIPIDEMIA TYPE: ICD-10-CM

## 2024-10-15 DIAGNOSIS — Z12.5 ENCOUNTER FOR PROSTATE CANCER SCREENING: Primary | ICD-10-CM

## 2024-10-15 DIAGNOSIS — I10 ESSENTIAL HYPERTENSION: ICD-10-CM

## 2024-10-15 DIAGNOSIS — N40.0 BENIGN PROSTATIC HYPERPLASIA WITHOUT LOWER URINARY TRACT SYMPTOMS: ICD-10-CM

## 2024-10-15 PROCEDURE — 3074F SYST BP LT 130 MM HG: CPT | Performed by: STUDENT IN AN ORGANIZED HEALTH CARE EDUCATION/TRAINING PROGRAM

## 2024-10-15 PROCEDURE — 3008F BODY MASS INDEX DOCD: CPT | Performed by: STUDENT IN AN ORGANIZED HEALTH CARE EDUCATION/TRAINING PROGRAM

## 2024-10-15 PROCEDURE — 99214 OFFICE O/P EST MOD 30 MIN: CPT | Performed by: STUDENT IN AN ORGANIZED HEALTH CARE EDUCATION/TRAINING PROGRAM

## 2024-10-15 PROCEDURE — 3078F DIAST BP <80 MM HG: CPT | Performed by: STUDENT IN AN ORGANIZED HEALTH CARE EDUCATION/TRAINING PROGRAM

## 2024-10-15 PROCEDURE — 1036F TOBACCO NON-USER: CPT | Performed by: STUDENT IN AN ORGANIZED HEALTH CARE EDUCATION/TRAINING PROGRAM

## 2024-10-15 ASSESSMENT — ENCOUNTER SYMPTOMS
SHORTNESS OF BREATH: 0
HEMATURIA: 0
ABDOMINAL PAIN: 0
LIGHT-HEADEDNESS: 0
FEVER: 0
UNEXPECTED WEIGHT CHANGE: 0
DIZZINESS: 0
EYE PAIN: 0
CHILLS: 0
RHINORRHEA: 0

## 2024-10-15 NOTE — ASSESSMENT & PLAN NOTE
Blood pressure in good range.  Continue current medications.  Eye exam encouraged.  We discussed further dietary modifications which patient has not tried yet.

## 2024-10-15 NOTE — PROGRESS NOTES
"Subjective     Patient ID: Mercedes Garner is a 64 y.o. male who presents for follow-up of chronic conditions.  I will be taking on the role of patient's new PCP.    Review of Systems   Constitutional:  Negative for chills, fever and unexpected weight change.   HENT:  Negative for rhinorrhea.    Eyes:  Negative for pain.   Respiratory:  Negative for shortness of breath.    Cardiovascular:  Negative for chest pain.   Gastrointestinal:  Negative for abdominal pain.   Genitourinary:  Negative for hematuria.   Skin:  Negative for rash.   Neurological:  Negative for dizziness, syncope and light-headedness.   Psychiatric/Behavioral:  Negative for behavioral problems and suicidal ideas.        /74 (BP Location: Right arm, Patient Position: Sitting, BP Cuff Size: Adult)   Pulse 75   Temp 36.6 °C (97.9 °F)   Ht 1.702 m (5' 7\")   Wt 74.6 kg (164 lb 6.4 oz)   BMI 25.75 kg/m²      Objective   Physical Exam  Vitals reviewed.   Constitutional:       General: He is not in acute distress.  HENT:      Head: Normocephalic.      Right Ear: External ear normal.      Left Ear: External ear normal.      Nose: No rhinorrhea.      Mouth/Throat:      Mouth: Mucous membranes are moist.   Eyes:      Conjunctiva/sclera: Conjunctivae normal.   Cardiovascular:      Rate and Rhythm: Normal rate and regular rhythm.      Heart sounds: No murmur heard.     No friction rub. No gallop.   Pulmonary:      Effort: No respiratory distress.      Breath sounds: No wheezing, rhonchi or rales.   Abdominal:      General: Bowel sounds are normal. There is no distension.      Palpations: Abdomen is soft.      Tenderness: There is no abdominal tenderness.   Musculoskeletal:      Cervical back: Neck supple.      Right lower leg: No edema.      Left lower leg: No edema.   Skin:     General: Skin is warm and dry.      Capillary Refill: Capillary refill takes less than 2 seconds.   Neurological:      Mental Status: He is alert.      Gait: Gait normal. " "          Labs:   Lab Results   Component Value Date    WBC 5.7 09/21/2023    HGB 14.7 09/21/2023    HCT 42.2 09/21/2023     09/21/2023    TSH 1.15 09/21/2023    PSA 0.27 09/21/2023    INR 1.2 (H) 07/22/2020     Lab Results   Component Value Date     06/19/2024    K 4.5 06/19/2024     06/19/2024    BUN 9 06/19/2024    CREATININE 0.77 06/19/2024    GLUCOSE 88 06/19/2024    CALCIUM 8.8 06/19/2024    PROT 6.8 06/19/2024    BILITOT 1.0 06/19/2024    ALKPHOS 50 06/19/2024    AST 22 06/19/2024    ALT 22 06/19/2024     Lab Results   Component Value Date    CHOL 106 06/19/2024    CHOL 225 (H) 09/21/2023    CHOL 196 08/11/2022      Lab Results   Component Value Date    TRIG 55 06/19/2024    TRIG 97 09/21/2023    TRIG 78 08/11/2022      Lab Results   Component Value Date    HDL 54.3 06/19/2024    HDL 63.4 09/21/2023    HDL 55.1 08/11/2022     Lab Results   Component Value Date    LDLCALC 41 06/19/2024      Lab Results   Component Value Date    VLDL 11 06/19/2024    VLDL 19 09/21/2023    VLDL 16 08/11/2022    No components found for: \"[id number]\"    Imaging/Testing: XR lumbar spine complete 4+ views  Narrative: Interpreted By:  Charles Kuo,   STUDY:  XR LUMBAR SPINE COMPLETE 4+ VIEWS; ;  12/18/2023 9:21 am      INDICATION:  Signs/Symptoms:Pain with back extension; positive right SLR; low back  pain with radiculopathy- right side.      COMPARISON:  05/25/2021      ACCESSION NUMBER(S):  [id number]      ORDERING CLINICIAN:  OSKAR MAURICIO      FINDINGS:  Lumbar spine, five views      There is moderate facet hypertrophy and sclerosis in the lower lumbar  spine. There is mild disc space narrowing osteophytosis in the lower  lumbar spine as well. There is mild anterolisthesis of L3 on L4. No  fracture seen      Impression: Moderate facet disease and mild spondylosis lower lumbar spine.  Mild anterolisthesis L3 on L4          MACRO:  None      Signed by: Charles Kuo 12/19/2023 7:49 PM  Dictation " workstation:   [id number]    Assessment/Plan   Problem List Items Addressed This Visit       Pulmonary embolus     Mercedes has a personal history of blood clots and a family history of hypercoagulability. This could have been an unprovoked PE, based on patient reports.   -I did a fall risk assessment of patient, and he is not high risk.  -Continue Xarelto.         BPH (benign prostatic hyperplasia)     -Given the patient's need for MRI prostate in the past.  Will obtain updated PSA level today.         Hyperlipidemia     CT calcium score 0 in 2019.  Fasting lipids due now.  Discussed goal of non-HDL cholesterol less than 100 with respect to his history of hypertension and elevated blood sugar.  Patient is now within goal with atorvastatin 40 mg, which he will continue.         Essential hypertension     Blood pressure in good range.  Continue current medications.  Eye exam encouraged.  We discussed further dietary modifications which patient has not tried yet.         History of colonic polyps     -Patient and I discussed the GI recommendations of his colonoscopy performed June 2024.  There was 1 tubular adenoma, so the recommendation is for repeat colonoscopy June 2029.  I updated the EMR to reflect that.          Other Visit Diagnoses       Encounter for prostate cancer screening    -  Primary    Relevant Orders    Prostate Specific Antigen, Screen            [unfilled]    Patient and I discussed diet/nutrition, lifestyle modifications, safety, medication indications and side effects, and health goals.    current treatment plan is effective, no change in therapy, orders and follow up as documented in EMR, lab results reviewed with patient, repeat labs ordered prior to next appointment, reviewed compliance with lifestyle measures, reviewed diet, exercise and weight control, reviewed medications and side effects in detail     August 2025 for initial/first medical annual wellness visit.      I have reviewed OARRS  report, consistent with prescribed medication. I have considered risks of abuse, diversion, side effects and feel clinically benefit of medication outweighs risks at this time.     I spent 30 minutes in duration for this visit today. This time consisted of chart review, obtaining history, and/or performing the exam as documented above as well as documenting the clinical information for the encounter in the electronic record, discussing treatment options, plans, and/or goals with patient, family, and/or caregiver, refilling medications, updating the electronic record, ordering medicines, lab work, imaging, referrals, and/or procedures as documented above and communicating with other [facility] professionals.

## 2024-10-15 NOTE — ASSESSMENT & PLAN NOTE
-Patient and I discussed the GI recommendations of his colonoscopy performed June 2024.  There was 1 tubular adenoma, so the recommendation is for repeat colonoscopy June 2029.  I updated the EMR to reflect that.

## 2024-10-15 NOTE — ASSESSMENT & PLAN NOTE
CT calcium score 0 in 2019.  Fasting lipids due now.  Discussed goal of non-HDL cholesterol less than 100 with respect to his history of hypertension and elevated blood sugar.  Patient is now within goal with atorvastatin 40 mg, which he will continue.

## 2024-10-15 NOTE — ASSESSMENT & PLAN NOTE
Mercedes has a personal history of blood clots and a family history of hypercoagulability. This could have been an unprovoked PE, based on patient reports.   -I did a fall risk assessment of patient, and he is not high risk.  -Continue Xarelto.

## 2024-10-22 ENCOUNTER — APPOINTMENT (OUTPATIENT)
Dept: PRIMARY CARE | Facility: HOSPITAL | Age: 64
End: 2024-10-22
Payer: COMMERCIAL

## 2024-11-11 DIAGNOSIS — I26.99 PULMONARY EMBOLISM, UNSPECIFIED CHRONICITY, UNSPECIFIED PULMONARY EMBOLISM TYPE, UNSPECIFIED WHETHER ACUTE COR PULMONALE PRESENT (MULTI): ICD-10-CM

## 2024-11-11 RX ORDER — RIVAROXABAN 10 MG/1
10 TABLET, FILM COATED ORAL DAILY
Qty: 90 TABLET | Refills: 3 | Status: SHIPPED | OUTPATIENT
Start: 2024-11-11

## 2024-11-24 ENCOUNTER — HOSPITAL ENCOUNTER (EMERGENCY)
Facility: HOSPITAL | Age: 64
Discharge: HOME | End: 2024-11-24
Payer: COMMERCIAL

## 2024-11-24 VITALS
DIASTOLIC BLOOD PRESSURE: 89 MMHG | BODY MASS INDEX: 25.74 KG/M2 | HEART RATE: 88 BPM | OXYGEN SATURATION: 100 % | HEIGHT: 67 IN | TEMPERATURE: 98.1 F | WEIGHT: 164 LBS | SYSTOLIC BLOOD PRESSURE: 137 MMHG

## 2024-11-24 DIAGNOSIS — J02.0 STREP PHARYNGITIS: Primary | ICD-10-CM

## 2024-11-24 LAB
FLUAV RNA RESP QL NAA+PROBE: NOT DETECTED
FLUBV RNA RESP QL NAA+PROBE: NOT DETECTED
SARS-COV-2 RNA RESP QL NAA+PROBE: NOT DETECTED

## 2024-11-24 PROCEDURE — 2500000002 HC RX 250 W HCPCS SELF ADMINISTERED DRUGS (ALT 637 FOR MEDICARE OP, ALT 636 FOR OP/ED): Performed by: SURGERY

## 2024-11-24 PROCEDURE — 2500000005 HC RX 250 GENERAL PHARMACY W/O HCPCS: Performed by: SURGERY

## 2024-11-24 PROCEDURE — 87636 SARSCOV2 & INF A&B AMP PRB: CPT | Performed by: GENERAL PRACTICE

## 2024-11-24 PROCEDURE — 99283 EMERGENCY DEPT VISIT LOW MDM: CPT

## 2024-11-24 RX ORDER — PENICILLIN V POTASSIUM 250 MG/1
500 TABLET, FILM COATED ORAL ONCE
Status: COMPLETED | OUTPATIENT
Start: 2024-11-24 | End: 2024-11-24

## 2024-11-24 RX ORDER — PENICILLIN V POTASSIUM 500 MG/1
500 TABLET, FILM COATED ORAL 3 TIMES DAILY
Qty: 30 TABLET | Refills: 0 | Status: SHIPPED | OUTPATIENT
Start: 2024-11-24 | End: 2024-12-04

## 2024-11-24 RX ORDER — ONDANSETRON 4 MG/1
4 TABLET, ORALLY DISINTEGRATING ORAL ONCE
Status: COMPLETED | OUTPATIENT
Start: 2024-11-24 | End: 2024-11-24

## 2024-11-24 ASSESSMENT — COLUMBIA-SUICIDE SEVERITY RATING SCALE - C-SSRS
2. HAVE YOU ACTUALLY HAD ANY THOUGHTS OF KILLING YOURSELF?: NO
1. IN THE PAST MONTH, HAVE YOU WISHED YOU WERE DEAD OR WISHED YOU COULD GO TO SLEEP AND NOT WAKE UP?: NO
6. HAVE YOU EVER DONE ANYTHING, STARTED TO DO ANYTHING, OR PREPARED TO DO ANYTHING TO END YOUR LIFE?: NO

## 2024-11-24 ASSESSMENT — PAIN - FUNCTIONAL ASSESSMENT: PAIN_FUNCTIONAL_ASSESSMENT: 0-10

## 2024-11-24 ASSESSMENT — PAIN DESCRIPTION - LOCATION: LOCATION: THROAT

## 2024-11-24 ASSESSMENT — PAIN DESCRIPTION - PAIN TYPE: TYPE: ACUTE PAIN

## 2024-11-25 NOTE — ED PROVIDER NOTES
Chief Complaint   Patient presents with    Flu Symptoms     HPI:   Mercedes Garner is an 64 y.o. male presenting to the ED for chief complaint of a sore throat for the last week.  Explains his pain is worse with swallowing and talking.  He reports sweats and chills however has not taken his temperature.  No congestion or cough.  Explains that today he developed nausea without vomiting.  No sick contacts.  Has not tried any alleviating factors.    Allergies   Allergen Reactions    Other Hives     TIDE laundry soap   :  Past Medical History:   Diagnosis Date    Atypical chest pain 07/18/2023    Black stool 07/18/2023    BPH (benign prostatic hyperplasia)     Family history of hypercoagulability     Gastro-esophageal reflux disease without esophagitis 03/22/2021    GERD without esophagitis    Hyperlipidemia     Hypertension     Insulin resistance     Personal history of other specified conditions 03/22/2021    History of weight loss    Pulmonary embolus     Pulmonary nodules      Past Surgical History:   Procedure Laterality Date    ANTERIOR CRUCIATE LIGAMENT REPAIR  1996    sports related     Family History   Problem Relation Name Age of Onset    Diabetes Mother      Hypertension Mother      Heart failure Mother      Blood clot Mother      Liver cancer Father      Diabetes Father      Hypertension Father      No Known Problems Sister      Thyroid disease Daughter          thyroidectomy (materal side)    No Known Problems Daughter      No Known Problems Grandchild 4         [place]        Physical Exam  Vitals and nursing note reviewed.   Constitutional:       General: He is not in acute distress.     Appearance: He is well-developed.   HENT:      Head: Normocephalic and atraumatic.      Right Ear: Tympanic membrane normal.      Left Ear: Tympanic membrane normal.      Nose: Nose normal.      Mouth/Throat:      Mouth: Mucous membranes are moist.      Pharynx: Oropharyngeal exudate and posterior oropharyngeal erythema  present.   Eyes:      Extraocular Movements: Extraocular movements intact.      Conjunctiva/sclera: Conjunctivae normal.      Pupils: Pupils are equal, round, and reactive to light.   Cardiovascular:      Rate and Rhythm: Normal rate and regular rhythm.      Heart sounds: Normal heart sounds. No murmur heard.  Pulmonary:      Effort: Pulmonary effort is normal. No respiratory distress.      Breath sounds: Normal breath sounds.   Abdominal:      Palpations: Abdomen is soft.      Tenderness: There is no abdominal tenderness. There is no guarding or rebound.   Musculoskeletal:         General: No swelling.      Cervical back: Neck supple.   Lymphadenopathy:      Cervical: Cervical adenopathy present.   Skin:     General: Skin is warm and dry.      Capillary Refill: Capillary refill takes less than 2 seconds.   Neurological:      General: No focal deficit present.      Mental Status: He is alert and oriented to person, place, and time.   Psychiatric:         Mood and Affect: Mood normal.        VS: As documented in the triage note and EMR flowsheet from this visit were reviewed.    Medical Decision Making: This is a 64-year-old male presenting to the ED for chief complaint of a sore throat.  Explains that his symptoms began about a week ago.  Explains that he has worsening pain when he swallows or talks.  He denies cough.  On exam his vitals are stable.  He is afebrile is speaking full sentences tolerating his secretions in no acute distress.  No stridor.  He does have an erythematous posterior oropharynx with exudate present.  No swelling.  Uvula is midline.  No dysphonia.  Low suspicion for peritonsillar abscess or retropharyngeal abscess.  He does have positive cervical lymphadenopathy.  No cough.  Centor criteria suggestive of strep.  His swabs were negative for COVID and flu however clinical presentation I am suspicious of strep.  He was started on penicillin in the ED and given a course for outpatient management.   Recommended to follow-up with the PCP.  He understand strict return precautions.  He is agreeable to the plan.  Diagnoses as of 11/24/24 2009   Strep pharyngitis     Counseling: Spoke with the patient and discussed today´s findings, in addition to providing specific details for the plan of care and expected course.  Patient was given the opportunity to ask questions.    Discussed return precautions and importance of follow-up.  Advised to follow-up with PCP.  I specifically advised to return to the ED for changing or worsening symptoms, new symptoms, complaint specific precautions, and precautions listed on the discharge paperwork.  Educated on the common potential side effects of medications prescribed.    I advised the patient that the emergency evaluation and treatment provided today doesn't end their need for medical care. It is very important that they follow-up with their primary care provider or other specialist as instructed.    The plan of care was mutually agreed upon with the patient. The patient and/or family were given the opportunity to ask questions. All questions asked today in the ED were answered to the best of my ability with today's information.    This report was transcribed using voice recognition software.  Every effort was made to ensure accuracy, however, inadvertently computerized transcription errors may be present.       Asim Lakhani PA-C  11/24/24 2022

## 2024-11-25 NOTE — DISCHARGE INSTRUCTIONS
You were evaluated in the ED for a sore throat.  He tested negative for COVID and flu.  I am concerned that you have strep based on your physical exam.  You were started on penicillin in the ED.  Please take this medication 3 times daily for the next 10 days.  Follow-up with your primary care doctor and return to the ED for worsening symptoms.  May use salt water gargles, tea with honey and cough drops to alleviate your sore throat.

## 2024-12-10 ENCOUNTER — LAB (OUTPATIENT)
Dept: LAB | Facility: LAB | Age: 64
End: 2024-12-10
Payer: COMMERCIAL

## 2024-12-10 ENCOUNTER — APPOINTMENT (OUTPATIENT)
Dept: PRIMARY CARE | Facility: CLINIC | Age: 64
End: 2024-12-10
Payer: COMMERCIAL

## 2024-12-10 VITALS
BODY MASS INDEX: 26.62 KG/M2 | TEMPERATURE: 98 F | WEIGHT: 169.6 LBS | OXYGEN SATURATION: 98 % | DIASTOLIC BLOOD PRESSURE: 74 MMHG | SYSTOLIC BLOOD PRESSURE: 110 MMHG | HEIGHT: 67 IN | HEART RATE: 66 BPM

## 2024-12-10 DIAGNOSIS — I10 ESSENTIAL HYPERTENSION: Primary | ICD-10-CM

## 2024-12-10 DIAGNOSIS — Z12.5 ENCOUNTER FOR PROSTATE CANCER SCREENING: ICD-10-CM

## 2024-12-10 DIAGNOSIS — E88.819 INSULIN RESISTANCE: ICD-10-CM

## 2024-12-10 DIAGNOSIS — E78.5 HYPERLIPIDEMIA, UNSPECIFIED HYPERLIPIDEMIA TYPE: ICD-10-CM

## 2024-12-10 DIAGNOSIS — E78.5 DYSLIPIDEMIA: ICD-10-CM

## 2024-12-10 DIAGNOSIS — I10 ESSENTIAL HYPERTENSION: ICD-10-CM

## 2024-12-10 DIAGNOSIS — I26.93 SINGLE SUBSEGMENTAL PULMONARY EMBOLISM WITHOUT ACUTE COR PULMONALE: ICD-10-CM

## 2024-12-10 DIAGNOSIS — N40.0 BENIGN PROSTATIC HYPERPLASIA WITHOUT LOWER URINARY TRACT SYMPTOMS: ICD-10-CM

## 2024-12-10 DIAGNOSIS — Z23 NEED FOR TETANUS, DIPHTHERIA, AND ACELLULAR PERTUSSIS (TDAP) VACCINE: ICD-10-CM

## 2024-12-10 DIAGNOSIS — Z87.891 HISTORY OF SMOKING 30 OR MORE PACK YEARS: ICD-10-CM

## 2024-12-10 LAB
ALBUMIN SERPL BCP-MCNC: 3.8 G/DL (ref 3.4–5)
ALP SERPL-CCNC: 55 U/L (ref 33–136)
ALT SERPL W P-5'-P-CCNC: 20 U/L (ref 10–52)
ANION GAP SERPL CALC-SCNC: 9 MMOL/L (ref 10–20)
AST SERPL W P-5'-P-CCNC: 19 U/L (ref 9–39)
BILIRUB SERPL-MCNC: 0.6 MG/DL (ref 0–1.2)
BUN SERPL-MCNC: 14 MG/DL (ref 6–23)
CALCIUM SERPL-MCNC: 9.2 MG/DL (ref 8.6–10.3)
CHLORIDE SERPL-SCNC: 105 MMOL/L (ref 98–107)
CO2 SERPL-SCNC: 29 MMOL/L (ref 21–32)
CREAT SERPL-MCNC: 0.83 MG/DL (ref 0.5–1.3)
EGFRCR SERPLBLD CKD-EPI 2021: >90 ML/MIN/1.73M*2
GLUCOSE SERPL-MCNC: 98 MG/DL (ref 74–99)
POTASSIUM SERPL-SCNC: 4.4 MMOL/L (ref 3.5–5.3)
PROT SERPL-MCNC: 6.1 G/DL (ref 6.4–8.2)
SODIUM SERPL-SCNC: 139 MMOL/L (ref 136–145)

## 2024-12-10 PROCEDURE — 3078F DIAST BP <80 MM HG: CPT | Performed by: STUDENT IN AN ORGANIZED HEALTH CARE EDUCATION/TRAINING PROGRAM

## 2024-12-10 PROCEDURE — 3074F SYST BP LT 130 MM HG: CPT | Performed by: STUDENT IN AN ORGANIZED HEALTH CARE EDUCATION/TRAINING PROGRAM

## 2024-12-10 PROCEDURE — 36415 COLL VENOUS BLD VENIPUNCTURE: CPT

## 2024-12-10 PROCEDURE — 84153 ASSAY OF PSA TOTAL: CPT

## 2024-12-10 PROCEDURE — 83036 HEMOGLOBIN GLYCOSYLATED A1C: CPT

## 2024-12-10 PROCEDURE — 80053 COMPREHEN METABOLIC PANEL: CPT

## 2024-12-10 PROCEDURE — 99214 OFFICE O/P EST MOD 30 MIN: CPT | Performed by: STUDENT IN AN ORGANIZED HEALTH CARE EDUCATION/TRAINING PROGRAM

## 2024-12-10 PROCEDURE — 90471 IMMUNIZATION ADMIN: CPT | Performed by: STUDENT IN AN ORGANIZED HEALTH CARE EDUCATION/TRAINING PROGRAM

## 2024-12-10 PROCEDURE — 1036F TOBACCO NON-USER: CPT | Performed by: STUDENT IN AN ORGANIZED HEALTH CARE EDUCATION/TRAINING PROGRAM

## 2024-12-10 PROCEDURE — 90715 TDAP VACCINE 7 YRS/> IM: CPT | Performed by: STUDENT IN AN ORGANIZED HEALTH CARE EDUCATION/TRAINING PROGRAM

## 2024-12-10 PROCEDURE — 3008F BODY MASS INDEX DOCD: CPT | Performed by: STUDENT IN AN ORGANIZED HEALTH CARE EDUCATION/TRAINING PROGRAM

## 2024-12-10 ASSESSMENT — ENCOUNTER SYMPTOMS
DIFFICULTY URINATING: 0
DEPRESSION: 0
LOSS OF SENSATION IN FEET: 0
CHEST TIGHTNESS: 0
CHILLS: 0
DYSURIA: 0
FEVER: 0
SHORTNESS OF BREATH: 0
OCCASIONAL FEELINGS OF UNSTEADINESS: 0

## 2024-12-10 ASSESSMENT — PAIN SCALES - GENERAL: PAINLEVEL_OUTOF10: 0-NO PAIN

## 2024-12-10 ASSESSMENT — PATIENT HEALTH QUESTIONNAIRE - PHQ9
1. LITTLE INTEREST OR PLEASURE IN DOING THINGS: NOT AT ALL
2. FEELING DOWN, DEPRESSED OR HOPELESS: NOT AT ALL
SUM OF ALL RESPONSES TO PHQ9 QUESTIONS 1 AND 2: 0

## 2024-12-10 NOTE — ASSESSMENT & PLAN NOTE
CT calcium score 0 in 2019.   Discussed goal of non-HDL cholesterol less than 100 with respect to his history of hypertension and elevated blood sugar.  Patient is now within goal with atorvastatin 40 mg, which he will continue.

## 2024-12-10 NOTE — PROGRESS NOTES
"Subjective     Patient ID: Mercedes Garner is a 64 y.o. male who presents for ED follow-up, hypertension, preventative health care.  Patient went to the ED on 11/20/2024 for throat pain.  He was hemodynamically stable, influenza negative, COVID-negative.  At that time, ED provider found that the patient has tonsillar exudates and cervical adenopathy.  You recovered over the past week with antibiotics.  Patient no longer complains of sore throat or pain.  He is due for his Tdap today.  Self    Pertienent Social History: Started smoking cigerettes at the age 18. Smoked average of 1/2-1-pack-per day. Quit smoking 2-years ago.     Review of Systems   Constitutional:  Negative for chills and fever.   Respiratory:  Negative for chest tightness and shortness of breath.    Cardiovascular:  Negative for chest pain.   Genitourinary:  Negative for difficulty urinating and dysuria.       /74 (BP Location: Left arm, Patient Position: Sitting)   Pulse 66   Temp 36.7 °C (98 °F)   Ht 1.702 m (5' 7\")   Wt 76.9 kg (169 lb 9.6 oz)   SpO2 98%   BMI 26.56 kg/m²      Objective   Physical Exam  Vitals reviewed.   Constitutional:       General: He is not in acute distress.  HENT:      Head: Normocephalic.      Right Ear: External ear normal.      Left Ear: External ear normal.      Nose: No rhinorrhea.      Mouth/Throat:      Mouth: Mucous membranes are moist.   Eyes:      Conjunctiva/sclera: Conjunctivae normal.   Cardiovascular:      Rate and Rhythm: Normal rate and regular rhythm.      Heart sounds: No murmur heard.     No friction rub. No gallop.   Pulmonary:      Effort: No respiratory distress.      Breath sounds: No wheezing, rhonchi or rales.   Abdominal:      General: Bowel sounds are normal. There is no distension.      Palpations: Abdomen is soft.      Tenderness: There is no abdominal tenderness.   Musculoskeletal:      Cervical back: Neck supple.      Right lower leg: No edema.      Left lower leg: No edema. " "  Skin:     General: Skin is warm and dry.      Capillary Refill: Capillary refill takes less than 2 seconds.   Neurological:      Mental Status: He is alert.      Gait: Gait normal.           Labs:   Lab Results   Component Value Date    WBC 5.7 09/21/2023    HGB 14.7 09/21/2023    HCT 42.2 09/21/2023     09/21/2023    TSH 1.15 09/21/2023    PSA 0.27 09/21/2023    INR 1.2 (H) 07/22/2020     Lab Results   Component Value Date     06/19/2024    K 4.5 06/19/2024     06/19/2024    BUN 9 06/19/2024    CREATININE 0.77 06/19/2024    GLUCOSE 88 06/19/2024    CALCIUM 8.8 06/19/2024    PROT 6.8 06/19/2024    BILITOT 1.0 06/19/2024    ALKPHOS 50 06/19/2024    AST 22 06/19/2024    ALT 22 06/19/2024     Lab Results   Component Value Date    CHOL 106 06/19/2024    CHOL 225 (H) 09/21/2023    CHOL 196 08/11/2022      Lab Results   Component Value Date    TRIG 55 06/19/2024    TRIG 97 09/21/2023    TRIG 78 08/11/2022      Lab Results   Component Value Date    HDL 54.3 06/19/2024    HDL 63.4 09/21/2023    HDL 55.1 08/11/2022     Lab Results   Component Value Date    LDLCALC 41 06/19/2024      Lab Results   Component Value Date    VLDL 11 06/19/2024    VLDL 19 09/21/2023    VLDL 16 08/11/2022    No components found for: \"[id number]\"    Imaging/Testing: XR lumbar spine complete 4+ views  Narrative: Interpreted By:  Charles Kuo,   STUDY:  XR LUMBAR SPINE COMPLETE 4+ VIEWS; ;  12/18/2023 9:21 am      INDICATION:  Signs/Symptoms:Pain with back extension; positive right SLR; low back  pain with radiculopathy- right side.      COMPARISON:  05/25/2021      ACCESSION NUMBER(S):  [id number]      ORDERING CLINICIAN:  OSKAR CONTRERASNSKE      FINDINGS:  Lumbar spine, five views      There is moderate facet hypertrophy and sclerosis in the lower lumbar  spine. There is mild disc space narrowing osteophytosis in the lower  lumbar spine as well. There is mild anterolisthesis of L3 on L4. No  fracture seen      Impression: " Moderate facet disease and mild spondylosis lower lumbar spine.  Mild anterolisthesis L3 on L4          MACRO:  None      Signed by: Charles Kuo 12/19/2023 7:49 PM  Dictation workstation:   [id number]      Problem List Items Addressed This Visit       Pulmonary embolus    Current Assessment & Plan     Mercedes has a personal history of blood clots and a family history of hypercoagulability. This could have been an unprovoked PE, based on patient reports.   -I did a fall risk assessment of patient, and he is not high risk.  -Continue Xarelto.         BPH (benign prostatic hyperplasia)    Current Assessment & Plan     -Given the patient's need for MRI prostate in the past.  Will obtain updated PSA level today.         Hyperlipidemia    Current Assessment & Plan     CT calcium score 0 in 2019.   Discussed goal of non-HDL cholesterol less than 100 with respect to his history of hypertension and elevated blood sugar.  Patient is now within goal with atorvastatin 40 mg, which he will continue.         Essential hypertension - Primary    Current Assessment & Plan     Blood pressure was elevated in the ED on 11/24/2024 --most likely due to illness.  Blood pressure in good range today.  Continue current medications.  Eye exam encouraged.  We discussed further dietary modifications which patient has not tried yet.          Other Visit Diagnoses       Need for tetanus, diphtheria, and acellular pertussis (Tdap) vaccine        Relevant Orders    Tdap vaccine, age 7 years and older (Completed)    History of smoking 30 or more pack years        Relevant Orders    CT lung screening low dose            [unfilled]    Patient and I discussed diet/nutrition, lifestyle modifications, safety, medication indications and side effects, and health goals.    current treatment plan is effective, no change in therapy, orders and follow up as documented in EMR, lab results reviewed with patient, repeat labs ordered prior to next appointment,  reviewed compliance with lifestyle measures, reviewed diet, exercise and weight control, reviewed medications and side effects in detail     Return to clinic 7/30/2025 for a welcome to Medicare visit.      I have reviewed OARRS report, consistent with prescribed medication. I have considered risks of abuse, diversion, side effects and feel clinically benefit of medication outweighs risks at this time.      I spent 30 minutes in duration for this visit today. This time consisted of chart review, obtaining history, and/or performing the exam as documented above as well as documenting the clinical information for the encounter in the electronic record, discussing treatment options, plans, and/or goals with patient, family, and/or caregiver, refilling medications, updating the electronic record, ordering medicines, lab work, imaging, referrals, and/or procedures as documented above and communicating with other [facility] professionals.

## 2024-12-10 NOTE — ASSESSMENT & PLAN NOTE
Blood pressure was elevated in the ED on 11/24/2024 --most likely due to illness.  Blood pressure in good range today.  Continue current medications.  Eye exam encouraged.  We discussed further dietary modifications which patient has not tried yet.

## 2024-12-11 LAB
EST. AVERAGE GLUCOSE BLD GHB EST-MCNC: 108 MG/DL
HBA1C MFR BLD: 5.4 %
PSA SERPL-MCNC: 0.27 NG/ML

## 2024-12-24 ENCOUNTER — HOSPITAL ENCOUNTER (EMERGENCY)
Facility: HOSPITAL | Age: 64
Discharge: HOME | End: 2024-12-24
Attending: EMERGENCY MEDICINE
Payer: COMMERCIAL

## 2024-12-24 VITALS
RESPIRATION RATE: 18 BRPM | DIASTOLIC BLOOD PRESSURE: 86 MMHG | OXYGEN SATURATION: 100 % | HEART RATE: 103 BPM | HEIGHT: 67 IN | BODY MASS INDEX: 25.9 KG/M2 | WEIGHT: 165 LBS | SYSTOLIC BLOOD PRESSURE: 146 MMHG | TEMPERATURE: 98 F

## 2024-12-24 DIAGNOSIS — K92.2 GASTROINTESTINAL HEMORRHAGE, UNSPECIFIED GASTROINTESTINAL HEMORRHAGE TYPE: Primary | ICD-10-CM

## 2024-12-24 LAB
ALBUMIN SERPL BCP-MCNC: 4.3 G/DL (ref 3.4–5)
ALP SERPL-CCNC: 59 U/L (ref 33–136)
ALT SERPL W P-5'-P-CCNC: 22 U/L (ref 10–52)
ANION GAP SERPL CALC-SCNC: 14 MMOL/L (ref 10–20)
APTT PPP: 44 SECONDS (ref 27–38)
AST SERPL W P-5'-P-CCNC: 23 U/L (ref 9–39)
BASOPHILS # BLD AUTO: 0.03 X10*3/UL (ref 0–0.1)
BASOPHILS NFR BLD AUTO: 0.6 %
BILIRUB SERPL-MCNC: 1.2 MG/DL (ref 0–1.2)
BUN SERPL-MCNC: 8 MG/DL (ref 6–23)
CALCIUM SERPL-MCNC: 9.6 MG/DL (ref 8.6–10.3)
CHLORIDE SERPL-SCNC: 107 MMOL/L (ref 98–107)
CO2 SERPL-SCNC: 21 MMOL/L (ref 21–32)
CREAT SERPL-MCNC: 0.64 MG/DL (ref 0.5–1.3)
EGFRCR SERPLBLD CKD-EPI 2021: >90 ML/MIN/1.73M*2
EOSINOPHIL # BLD AUTO: 0.14 X10*3/UL (ref 0–0.7)
EOSINOPHIL NFR BLD AUTO: 2.6 %
ERYTHROCYTE [DISTWIDTH] IN BLOOD BY AUTOMATED COUNT: 12 % (ref 11.5–14.5)
FLUAV RNA RESP QL NAA+PROBE: NOT DETECTED
FLUBV RNA RESP QL NAA+PROBE: NOT DETECTED
GLUCOSE SERPL-MCNC: 103 MG/DL (ref 74–99)
HCT VFR BLD AUTO: 40.6 % (ref 41–52)
HEMOCCULT SP1 STL QL: POSITIVE
HGB BLD-MCNC: 14.4 G/DL (ref 13.5–17.5)
IMM GRANULOCYTES # BLD AUTO: 0.01 X10*3/UL (ref 0–0.7)
IMM GRANULOCYTES NFR BLD AUTO: 0.2 % (ref 0–0.9)
INR PPP: 1.6 (ref 0.9–1.1)
LYMPHOCYTES # BLD AUTO: 1.73 X10*3/UL (ref 1.2–4.8)
LYMPHOCYTES NFR BLD AUTO: 31.8 %
MCH RBC QN AUTO: 35 PG (ref 26–34)
MCHC RBC AUTO-ENTMCNC: 35.5 G/DL (ref 32–36)
MCV RBC AUTO: 99 FL (ref 80–100)
MONOCYTES # BLD AUTO: 0.46 X10*3/UL (ref 0.1–1)
MONOCYTES NFR BLD AUTO: 8.5 %
NEUTROPHILS # BLD AUTO: 3.07 X10*3/UL (ref 1.2–7.7)
NEUTROPHILS NFR BLD AUTO: 56.3 %
NRBC BLD-RTO: 0 /100 WBCS (ref 0–0)
PLATELET # BLD AUTO: 282 X10*3/UL (ref 150–450)
POTASSIUM SERPL-SCNC: 4 MMOL/L (ref 3.5–5.3)
PROT SERPL-MCNC: 7.2 G/DL (ref 6.4–8.2)
PROTHROMBIN TIME: 18.5 SECONDS (ref 9.8–12.8)
RBC # BLD AUTO: 4.12 X10*6/UL (ref 4.5–5.9)
RSV RNA RESP QL NAA+PROBE: NOT DETECTED
SARS-COV-2 RNA RESP QL NAA+PROBE: NOT DETECTED
SODIUM SERPL-SCNC: 138 MMOL/L (ref 136–145)
WBC # BLD AUTO: 5.4 X10*3/UL (ref 4.4–11.3)

## 2024-12-24 PROCEDURE — 85730 THROMBOPLASTIN TIME PARTIAL: CPT | Performed by: EMERGENCY MEDICINE

## 2024-12-24 PROCEDURE — 84075 ASSAY ALKALINE PHOSPHATASE: CPT | Performed by: EMERGENCY MEDICINE

## 2024-12-24 PROCEDURE — 85610 PROTHROMBIN TIME: CPT | Performed by: EMERGENCY MEDICINE

## 2024-12-24 PROCEDURE — 82270 OCCULT BLOOD FECES: CPT | Performed by: EMERGENCY MEDICINE

## 2024-12-24 PROCEDURE — 36415 COLL VENOUS BLD VENIPUNCTURE: CPT | Performed by: EMERGENCY MEDICINE

## 2024-12-24 PROCEDURE — 85025 COMPLETE CBC W/AUTO DIFF WBC: CPT | Performed by: EMERGENCY MEDICINE

## 2024-12-24 PROCEDURE — 99283 EMERGENCY DEPT VISIT LOW MDM: CPT | Performed by: EMERGENCY MEDICINE

## 2024-12-24 PROCEDURE — 87081 CULTURE SCREEN ONLY: CPT | Mod: AHULAB | Performed by: EMERGENCY MEDICINE

## 2024-12-24 PROCEDURE — 87637 SARSCOV2&INF A&B&RSV AMP PRB: CPT | Performed by: EMERGENCY MEDICINE

## 2024-12-24 RX ORDER — OMEPRAZOLE 40 MG/1
40 CAPSULE, DELAYED RELEASE ORAL
Qty: 30 CAPSULE | Refills: 0 | Status: SHIPPED | OUTPATIENT
Start: 2024-12-24 | End: 2025-01-23

## 2024-12-24 RX ORDER — OMEPRAZOLE 40 MG/1
40 CAPSULE, DELAYED RELEASE ORAL
Qty: 30 CAPSULE | Refills: 0 | Status: SHIPPED | OUTPATIENT
Start: 2024-12-24 | End: 2024-12-24

## 2024-12-24 ASSESSMENT — PAIN DESCRIPTION - FREQUENCY: FREQUENCY: CONSTANT/CONTINUOUS

## 2024-12-24 ASSESSMENT — PAIN SCALES - GENERAL: PAINLEVEL_OUTOF10: 5 - MODERATE PAIN

## 2024-12-24 ASSESSMENT — COLUMBIA-SUICIDE SEVERITY RATING SCALE - C-SSRS
6. HAVE YOU EVER DONE ANYTHING, STARTED TO DO ANYTHING, OR PREPARED TO DO ANYTHING TO END YOUR LIFE?: NO
2. HAVE YOU ACTUALLY HAD ANY THOUGHTS OF KILLING YOURSELF?: NO
1. IN THE PAST MONTH, HAVE YOU WISHED YOU WERE DEAD OR WISHED YOU COULD GO TO SLEEP AND NOT WAKE UP?: NO

## 2024-12-24 ASSESSMENT — PAIN DESCRIPTION - ORIENTATION: ORIENTATION: UPPER

## 2024-12-24 ASSESSMENT — PAIN - FUNCTIONAL ASSESSMENT: PAIN_FUNCTIONAL_ASSESSMENT: 0-10

## 2024-12-24 ASSESSMENT — PAIN DESCRIPTION - DESCRIPTORS: DESCRIPTORS: OTHER (COMMENT)

## 2024-12-24 ASSESSMENT — PAIN DESCRIPTION - LOCATION: LOCATION: ABDOMEN

## 2024-12-24 ASSESSMENT — PAIN DESCRIPTION - PAIN TYPE: TYPE: ACUTE PAIN

## 2024-12-24 NOTE — ED PROVIDER NOTES
HPI   Chief Complaint   Patient presents with    Hypertension    Rectal Bleeding       Chief complaint: Malaise, black stool    History of present illness: Patient is a 64-year-old male currently on Xarelto anticoagulation presenting to the emergency department several complaints.  According to the patient, he was recently seen diagnosed with and treated for strep throat.  Patient states that after treatment of this, he immediately began to feel improvement however, after the ending of his antibiotics, he began to feel unwell again.  Patient states that he feels that he does not have any energy.  The patient states that he has had chills on and off.  In addition, the patient began to notice melanic stools.  Patient denies any pain at this time.  Concern for the symptoms, the patient presents to the emergency department for further evaluation he has no other complaints at this time.  He states he otherwise feels relatively well.  He denies any lightheadedness or syncopal episodes.      History provided by:  Patient   used: No            Patient History   Past Medical History:   Diagnosis Date    Atypical chest pain 07/18/2023    Black stool 07/18/2023    BPH (benign prostatic hyperplasia)     Family history of hypercoagulability     Gastro-esophageal reflux disease without esophagitis 03/22/2021    GERD without esophagitis    Hyperlipidemia     Hypertension     Insulin resistance     Personal history of other specified conditions 03/22/2021    History of weight loss    Pulmonary embolus     Pulmonary nodules      Past Surgical History:   Procedure Laterality Date    ANTERIOR CRUCIATE LIGAMENT REPAIR  1996    sports related     Family History   Problem Relation Name Age of Onset    Diabetes Mother      Hypertension Mother      Heart failure Mother      Blood clot Mother      Liver cancer Father      Diabetes Father      Hypertension Father      No Known Problems Sister      Thyroid disease Daughter           thyroidectomy (materal side)    No Known Problems Daughter      No Known Problems Grandchild 4         Page     Social History     Tobacco Use    Smoking status: Never    Smokeless tobacco: Never    Tobacco comments:     Quit 2 years ago - 20-30 pk yr history.  On and off throughout adult life    Vaping Use    Vaping status: Never Used   Substance Use Topics    Alcohol use: Yes     Alcohol/week: 1.0 standard drink of alcohol     Types: 1 Cans of beer per week     Comment: 1 per day    Drug use: Never       Physical Exam   ED Triage Vitals [12/24/24 1110]   Temperature Heart Rate Respirations BP   36.7 °C (98 °F) (!) 103 18 146/86      Pulse Ox Temp Source Heart Rate Source Patient Position   100 % Temporal Monitor --      BP Location FiO2 (%)     -- --       Physical Exam  Vitals and nursing note reviewed.   Constitutional:       General: He is not in acute distress.     Appearance: He is well-developed.   HENT:      Head: Normocephalic and atraumatic.   Eyes:      Conjunctiva/sclera: Conjunctivae normal.   Cardiovascular:      Rate and Rhythm: Normal rate and regular rhythm.      Heart sounds: No murmur heard.  Pulmonary:      Effort: Pulmonary effort is normal. No respiratory distress.      Breath sounds: Normal breath sounds.   Abdominal:      Palpations: Abdomen is soft.      Tenderness: There is no abdominal tenderness.   Genitourinary:     Rectum: Guaiac result positive.   Musculoskeletal:         General: No swelling.      Cervical back: Neck supple.   Skin:     General: Skin is warm and dry.      Capillary Refill: Capillary refill takes less than 2 seconds.   Neurological:      Mental Status: He is alert.   Psychiatric:         Mood and Affect: Mood normal.           ED Course & MDM   Diagnoses as of 12/24/24 2246   Gastrointestinal hemorrhage, unspecified gastrointestinal hemorrhage type                 No data recorded     Cameron Coma Scale Score: 15 (12/24/24 1109 : David Cantrell RN)                            Medical Decision Making  Medical Decision Making: Patient migue stable during his time in the emergency department.  CBC demonstrated a hemoglobin of 14.4 but no other significant abnormalities Chem-7 and LFTs were all within normal limits.  Occult blood was positive influenza was negative RSV was negative COVID screen was negative PTT was 44 and the patient's INR is 1.6.    Patient presents to the emergency department with complaints of feeling unwell and melanotic stools.  Workup was performed as above.  At this time, the patient appears extremely well he has a normal hemoglobin and a normal BUN to creatinine ratio.  The patient was reassured.  I explained to the patient that he is likely presenting with a gastrointestinal bleed however at this time, he is hemodynamically stable and has had no impact on his hemoglobin.  The patient was given a prescription for pantoprazole.  He was instructed to see his primary care physician soon as possible for this issue and to return immediately if he has worsening melenic stools lightheadedness dizziness or worsening melena.  The patient expressed understanding and agreement.  The patient was then discharged home in otherwise stable condition.    Amount and/or Complexity of Data Reviewed  Labs: ordered. Decision-making details documented in ED Course.        Procedure  Procedures     Lenin Dillon MD  12/24/24 4688

## 2024-12-24 NOTE — ED TRIAGE NOTES
Pt c/o dark tarry stool for the past three days. Pt states his blood pressure has been elevated for the past 2 days. Pt also c/o upper abdominal pain/bloating.

## 2024-12-26 ENCOUNTER — TELEPHONE (OUTPATIENT)
Dept: PHARMACY | Facility: HOSPITAL | Age: 64
End: 2024-12-26
Payer: COMMERCIAL

## 2024-12-26 LAB — S PYO THROAT QL CULT: NORMAL

## 2024-12-26 NOTE — PROGRESS NOTES
EDPD Note: Rapid Result Review    I reviewed Mercedes Garner 's chart regarding a positive occult stool culture/result that was taken during their recent emergency room visit. The patient was told about these results prior to leaving the emergency department. Therefore, patient was not contacted as proper education was given prior to discharge from emergency department.     Patient presented to the ED with fatigue and black stool while on Xarelto therapy. Patient was discharged in stable condition after lab screenings were discussed. Patient educated on return parameters and was given prescription for omeprazole 40mg every day.     No results found for the last 90 days.    Admission on 12/24/2024, Discharged on 12/24/2024   Component Date Value Ref Range Status    WBC 12/24/2024 5.4  4.4 - 11.3 x10*3/uL Final    nRBC 12/24/2024 0.0  0.0 - 0.0 /100 WBCs Final    RBC 12/24/2024 4.12 (L)  4.50 - 5.90 x10*6/uL Final    Hemoglobin 12/24/2024 14.4  13.5 - 17.5 g/dL Final    Hematocrit 12/24/2024 40.6 (L)  41.0 - 52.0 % Final    MCV 12/24/2024 99  80 - 100 fL Final    MCH 12/24/2024 35.0 (H)  26.0 - 34.0 pg Final    MCHC 12/24/2024 35.5  32.0 - 36.0 g/dL Final    RDW 12/24/2024 12.0  11.5 - 14.5 % Final    Platelets 12/24/2024 282  150 - 450 x10*3/uL Final    Neutrophils % 12/24/2024 56.3  40.0 - 80.0 % Final    Immature Granulocytes %, Automated 12/24/2024 0.2  0.0 - 0.9 % Final    Immature Granulocyte Count (IG) includes promyelocytes, myelocytes and metamyelocytes but does not include bands. Percent differential counts (%) should be interpreted in the context of the absolute cell counts (cells/UL).    Lymphocytes % 12/24/2024 31.8  13.0 - 44.0 % Final    Monocytes % 12/24/2024 8.5  2.0 - 10.0 % Final    Eosinophils % 12/24/2024 2.6  0.0 - 6.0 % Final    Basophils % 12/24/2024 0.6  0.0 - 2.0 % Final    Neutrophils Absolute 12/24/2024 3.07  1.20 - 7.70 x10*3/uL Final    Percent differential counts (%) should be interpreted  in the context of the absolute cell counts (cells/uL).    Immature Granulocytes Absolute, Au* 12/24/2024 0.01  0.00 - 0.70 x10*3/uL Final    Lymphocytes Absolute 12/24/2024 1.73  1.20 - 4.80 x10*3/uL Final    Monocytes Absolute 12/24/2024 0.46  0.10 - 1.00 x10*3/uL Final    Eosinophils Absolute 12/24/2024 0.14  0.00 - 0.70 x10*3/uL Final    Basophils Absolute 12/24/2024 0.03  0.00 - 0.10 x10*3/uL Final    Glucose 12/24/2024 103 (H)  74 - 99 mg/dL Final    Sodium 12/24/2024 138  136 - 145 mmol/L Final    Potassium 12/24/2024 4.0  3.5 - 5.3 mmol/L Final    Chloride 12/24/2024 107  98 - 107 mmol/L Final    Bicarbonate 12/24/2024 21  21 - 32 mmol/L Final    Anion Gap 12/24/2024 14  10 - 20 mmol/L Final    Urea Nitrogen 12/24/2024 8  6 - 23 mg/dL Final    Creatinine 12/24/2024 0.64  0.50 - 1.30 mg/dL Final    eGFR 12/24/2024 >90  >60 mL/min/1.73m*2 Final    Calculations of estimated GFR are performed using the 2021 CKD-EPI Study Refit equation without the race variable for the IDMS-Traceable creatinine methods.  https://jasn.asnjournals.org/content/early/2021/09/22/ASN.9351330677    Calcium 12/24/2024 9.6  8.6 - 10.3 mg/dL Final    Albumin 12/24/2024 4.3  3.4 - 5.0 g/dL Final    Alkaline Phosphatase 12/24/2024 59  33 - 136 U/L Final    Total Protein 12/24/2024 7.2  6.4 - 8.2 g/dL Final    AST 12/24/2024 23  9 - 39 U/L Final    Bilirubin, Total 12/24/2024 1.2  0.0 - 1.2 mg/dL Final    ALT 12/24/2024 22  10 - 52 U/L Final    Patients treated with Sulfasalazine may generate falsely decreased results for ALT.    Protime 12/24/2024 18.5 (H)  9.8 - 12.8 seconds Final    INR 12/24/2024 1.6 (H)  0.9 - 1.1 Final    aPTT 12/24/2024 44 (H)  27 - 38 seconds Final    Group A Strep Screen, Culture 12/24/2024 No Group A Streptococcus (GAS) isolated   Final    Flu A Result 12/24/2024 Not Detected  Not Detected Final    Flu B Result 12/24/2024 Not Detected  Not Detected Final    Coronavirus 2019, PCR 12/24/2024 Not Detected  Not Detected  Final    RSV PCR 12/24/2024 Not Detected  Not Detected Final    Occult Blood, Stool X1 12/24/2024 Positive (A)  Negative Final       No further follow up needed from EDPD Team.     If there are any other questions for the ED Post-Discharge Culture Follow Up Team, please contact [phone]. Fax: [phone].    Caroline Jose, HaydeeD

## 2025-02-10 PROBLEM — E55.9 VITAMIN D DEFICIENCY: Status: RESOLVED | Noted: 2023-07-18 | Resolved: 2025-02-10

## 2025-02-10 PROBLEM — M51.369 LUMBAR DEGENERATIVE DISC DISEASE: Status: RESOLVED | Noted: 2023-07-18 | Resolved: 2025-02-10

## 2025-02-10 PROBLEM — M19.012 ARTHRITIS OF SHOULDER REGION, LEFT: Status: RESOLVED | Noted: 2023-07-18 | Resolved: 2025-02-10

## 2025-02-10 PROBLEM — E88.819 INSULIN RESISTANCE: Status: RESOLVED | Noted: 2024-05-23 | Resolved: 2025-02-10

## 2025-02-10 PROBLEM — R91.8 PULMONARY NODULES: Status: RESOLVED | Noted: 2023-07-18 | Resolved: 2025-02-10

## 2025-02-10 PROBLEM — Z23 INFLUENZA VACCINATION GIVEN: Status: RESOLVED | Noted: 2023-12-18 | Resolved: 2025-02-10

## 2025-02-10 PROBLEM — N52.9 INABILITY TO ATTAIN ERECTION: Status: RESOLVED | Noted: 2023-07-18 | Resolved: 2025-02-10

## 2025-02-10 PROBLEM — R01.1 CARDIAC MURMUR: Status: RESOLVED | Noted: 2023-07-18 | Resolved: 2025-02-10

## 2025-02-10 PROBLEM — E04.9 GOITER: Status: RESOLVED | Noted: 2023-07-18 | Resolved: 2025-02-10

## 2025-02-10 PROBLEM — M54.16 LUMBAR BACK PAIN WITH RADICULOPATHY AFFECTING RIGHT LOWER EXTREMITY: Status: RESOLVED | Noted: 2023-12-18 | Resolved: 2025-02-10

## 2025-02-10 PROBLEM — N40.2 PROSTATE NODULE: Status: RESOLVED | Noted: 2023-07-18 | Resolved: 2025-02-10

## 2025-02-10 PROBLEM — E88.810 DYSMETABOLIC SYNDROME: Status: RESOLVED | Noted: 2023-07-18 | Resolved: 2025-02-10

## 2025-02-10 ASSESSMENT — ENCOUNTER SYMPTOMS
CHEST TIGHTNESS: 0
CHILLS: 0
DYSURIA: 0
DIFFICULTY URINATING: 0
SHORTNESS OF BREATH: 0
FEVER: 0

## 2025-02-10 NOTE — ASSESSMENT & PLAN NOTE
-Patient and I discussed the GI recommendations of his colonoscopy performed June 2024.  There was 1 tubular adenoma, so the recommendation is for repeat colonoscopy June 2029.

## 2025-02-10 NOTE — ASSESSMENT & PLAN NOTE
-As indicated by my A1c of 5.9.  -Patient has been able to lower his A1c with dietary and lifestyle modifications.  -Will continue ARB for main indication of hypertension.  -Will obtain updated A1c.

## 2025-02-10 NOTE — PROGRESS NOTES
"Subjective     Patient ID: Mercedes Garner is a 64 y.o. male who       Initial PCP history 12/10/2024:  This is the first time I am meeting the patient.  Patient presents for ED follow-up, hypertension, preventative health care.  Patient went to the ED on 11/20/2024 for throat pain.  He was hemodynamically stable, influenza negative, COVID-negative.  At that time, ED provider found that the patient has tonsillar exudates and cervical adenopathy.  You recovered over the past week with antibiotics.  Patient no longer complains of sore throat or pain.  He is due for his Tdap today.      Interval updates 2/11/2025:  Patient has noticed dark stools for the past several months.  He also feels early satiety and bloating.  Patient denies fevers, chills, night sweats, unintentional weight loss, hemoptysis, hematemesis, hematochezia, easy bleeding/bruising, iron supplements,.  He does take Pepto-Bismol rarely.  He went to the emergency department December 2024 for similar issue.  His fecal occult blood was positive.  His last colonoscopy was September 2024 which did not show active bleeding.        Pertienent Social History: Started smoking cigerettes at the age 18. Smoked average of 1/2-1-pack-per day. Quit smoking 2-years ago.     Feels better when he eats. He experiences     Review of Systems   Constitutional:  Negative for chills and fever.   Respiratory:  Negative for chest tightness and shortness of breath.    Cardiovascular:  Negative for chest pain.   Gastrointestinal:  Positive for nausea. Negative for abdominal pain, constipation, diarrhea, rectal pain and vomiting.   Genitourinary:  Negative for difficulty urinating and dysuria.       /81   Pulse 100   Ht 1.702 m (5' 7\")   Wt 71.7 kg (158 lb)   SpO2 99%   BMI 24.75 kg/m²      Objective   Physical Exam  Vitals reviewed.   Constitutional:       General: He is not in acute distress.  HENT:      Head: Normocephalic.      Right Ear: External ear normal.      " Left Ear: External ear normal.      Nose: No rhinorrhea.      Mouth/Throat:      Mouth: Mucous membranes are moist.   Eyes:      Conjunctiva/sclera: Conjunctivae normal.   Cardiovascular:      Rate and Rhythm: Normal rate and regular rhythm.      Heart sounds: No murmur heard.     No friction rub. No gallop.   Pulmonary:      Effort: No respiratory distress.      Breath sounds: No wheezing, rhonchi or rales.   Abdominal:      General: Bowel sounds are normal. There is no distension.      Palpations: Abdomen is soft.      Tenderness: There is no abdominal tenderness.   Musculoskeletal:      Cervical back: Neck supple.      Right lower leg: No edema.      Left lower leg: No edema.   Skin:     General: Skin is warm and dry.      Capillary Refill: Capillary refill takes less than 2 seconds.   Neurological:      Mental Status: He is alert.      Gait: Gait normal.           Labs:   Lab Results   Component Value Date    WBC 5.4 12/24/2024    HGB 14.4 12/24/2024    HCT 40.6 (L) 12/24/2024     12/24/2024    TSH 1.15 09/21/2023    PSA 0.27 09/21/2023    INR 1.6 (H) 12/24/2024     Lab Results   Component Value Date     12/24/2024    K 4.0 12/24/2024     12/24/2024    BUN 8 12/24/2024    CREATININE 0.64 12/24/2024    GLUCOSE 103 (H) 12/24/2024    CALCIUM 9.6 12/24/2024    PROT 7.2 12/24/2024    BILITOT 1.2 12/24/2024    ALKPHOS 59 12/24/2024    AST 23 12/24/2024    ALT 22 12/24/2024     Lab Results   Component Value Date    CHOL 106 06/19/2024    CHOL 225 (H) 09/21/2023    CHOL 196 08/11/2022      Lab Results   Component Value Date    TRIG 55 06/19/2024    TRIG 97 09/21/2023    TRIG 78 08/11/2022      Lab Results   Component Value Date    HDL 54.3 06/19/2024    HDL 63.4 09/21/2023    HDL 55.1 08/11/2022     Lab Results   Component Value Date    LDLCALC 41 06/19/2024      Lab Results   Component Value Date    VLDL 11 06/19/2024    VLDL 19 09/21/2023    VLDL 16 08/11/2022    No components found for:  "\"[id number]\"    Imaging/Testing: XR lumbar spine complete 4+ views  Narrative: Interpreted By:  Charles Kuo,   STUDY:  XR LUMBAR SPINE COMPLETE 4+ VIEWS; ;  12/18/2023 9:21 am      INDICATION:  Signs/Symptoms:Pain with back extension; positive right SLR; low back  pain with radiculopathy- right side.      COMPARISON:  05/25/2021      ACCESSION NUMBER(S):  [id number]      ORDERING CLINICIAN:  OSKAR MAURICIO      FINDINGS:  Lumbar spine, five views      There is moderate facet hypertrophy and sclerosis in the lower lumbar  spine. There is mild disc space narrowing osteophytosis in the lower  lumbar spine as well. There is mild anterolisthesis of L3 on L4. No  fracture seen      Impression: Moderate facet disease and mild spondylosis lower lumbar spine.  Mild anterolisthesis L3 on L4          MACRO:  None      Signed by: Charles Kuo 12/19/2023 7:49 PM  Dictation workstation:   [id number]      Problem List Items Addressed This Visit          Medium    Pulmonary embolus    Current Assessment & Plan     Mercedes has a personal history of blood clots and a family history of hypercoagulability. This could have been an unprovoked PE, based on patient reports.   -I did a fall risk assessment of patient, and he is not high risk.  -Continue Xarelto.         BPH (benign prostatic hyperplasia)    Current Assessment & Plan     -Patient has had MRI prostate in the past.  -Will continue to monitor PSA levels.         Hyperlipidemia    Current Assessment & Plan     CT calcium score 0 in 2019.   Discussed goal of non-HDL cholesterol less than 100 with respect to his history of hypertension and elevated blood sugar.  Patient is now within goal with atorvastatin 40 mg, which he will continue.         Essential hypertension - Primary    Current Assessment & Plan     Blood pressure was elevated in the ED on 11/24/2024 --most likely due to illness.  Blood pressure in good range today.  Continue current medications.  Eye exam " encouraged.  We discussed further dietary modifications which patient has not tried yet.         Prediabetes    Current Assessment & Plan     -As indicated by my A1c of 5.9.  -Patient has been able to lower his A1c with dietary and lifestyle modifications.  -Will continue ARB for main indication of hypertension.  -Will obtain updated A1c.         Family history of hypercoagulability    History of colonic polyps    Current Assessment & Plan     -Patient and I discussed the GI recommendations of his colonoscopy performed June 2024.  There was 1 tubular adenoma, so the recommendation is for repeat colonoscopy June 2029.           Gastrointestinal hemorrhage    Current Assessment & Plan     -I reviewed the colonoscopy report from 7/31/2024.  It showed several polyps, but no active bleeding.  -I reviewed ED provider notes/labs from December 2024.  Patient's fecal occult blood test was positive.  Hemoglobin was within normal limits.  -Patient does not recall having an EGD before.  -Will restart omeprazole as symptoms are not controlled.  I sent a prescription to his pharmacy.  -Will refer to GI [facility] For follow-up.         Relevant Medications    omeprazole (PriLOSEC) 40 mg DR capsule     Other Visit Diagnoses       Lumbar back pain with radiculopathy affecting right lower extremity        Relevant Orders    Referral to Orthopaedic Surgery              [unfilled]    Patient and I discussed diet/nutrition, lifestyle modifications, safety, medication indications and side effects, and health goals.    current treatment plan is effective, no change in therapy, orders and follow up as documented in EMR, lab results reviewed with patient, repeat labs ordered prior to next appointment, reviewed compliance with lifestyle measures, reviewed diet, exercise and weight control, reviewed medications and side effects in detail     Return to clinic 7/30/2025 for a welcome to Medicare visit.      I have reviewed OARRS report, consistent  with prescribed medication. I have considered risks of abuse, diversion, side effects and feel clinically benefit of medication outweighs risks at this time.      I spent 30 minutes in duration for this visit today. This time consisted of chart review, obtaining history, and/or performing the exam as documented above as well as documenting the clinical information for the encounter in the electronic record, discussing treatment options, plans, and/or goals with patient, family, and/or caregiver, refilling medications, updating the electronic record, ordering medicines, lab work, imaging, referrals, and/or procedures as documented above and communicating with other [facility] professionals.

## 2025-02-11 ENCOUNTER — OFFICE VISIT (OUTPATIENT)
Dept: PRIMARY CARE | Facility: CLINIC | Age: 65
End: 2025-02-11
Payer: COMMERCIAL

## 2025-02-11 VITALS
WEIGHT: 158 LBS | SYSTOLIC BLOOD PRESSURE: 118 MMHG | BODY MASS INDEX: 24.8 KG/M2 | DIASTOLIC BLOOD PRESSURE: 81 MMHG | HEIGHT: 67 IN | HEART RATE: 100 BPM | OXYGEN SATURATION: 99 %

## 2025-02-11 DIAGNOSIS — M54.16 LUMBAR BACK PAIN WITH RADICULOPATHY AFFECTING RIGHT LOWER EXTREMITY: ICD-10-CM

## 2025-02-11 DIAGNOSIS — Z83.2 FAMILY HISTORY OF HYPERCOAGULABILITY: ICD-10-CM

## 2025-02-11 DIAGNOSIS — N40.0 BENIGN PROSTATIC HYPERPLASIA WITHOUT LOWER URINARY TRACT SYMPTOMS: ICD-10-CM

## 2025-02-11 DIAGNOSIS — Z86.0100 HISTORY OF COLONIC POLYPS: ICD-10-CM

## 2025-02-11 DIAGNOSIS — I10 ESSENTIAL HYPERTENSION: Primary | ICD-10-CM

## 2025-02-11 DIAGNOSIS — I26.93 SINGLE SUBSEGMENTAL PULMONARY EMBOLISM WITHOUT ACUTE COR PULMONALE: ICD-10-CM

## 2025-02-11 DIAGNOSIS — E78.5 HYPERLIPIDEMIA, UNSPECIFIED HYPERLIPIDEMIA TYPE: ICD-10-CM

## 2025-02-11 DIAGNOSIS — K92.2 GASTROINTESTINAL HEMORRHAGE, UNSPECIFIED GASTROINTESTINAL HEMORRHAGE TYPE: ICD-10-CM

## 2025-02-11 DIAGNOSIS — R73.03 PREDIABETES: ICD-10-CM

## 2025-02-11 PROCEDURE — 3008F BODY MASS INDEX DOCD: CPT | Performed by: STUDENT IN AN ORGANIZED HEALTH CARE EDUCATION/TRAINING PROGRAM

## 2025-02-11 PROCEDURE — 3079F DIAST BP 80-89 MM HG: CPT | Performed by: STUDENT IN AN ORGANIZED HEALTH CARE EDUCATION/TRAINING PROGRAM

## 2025-02-11 PROCEDURE — 1036F TOBACCO NON-USER: CPT | Performed by: STUDENT IN AN ORGANIZED HEALTH CARE EDUCATION/TRAINING PROGRAM

## 2025-02-11 PROCEDURE — 99214 OFFICE O/P EST MOD 30 MIN: CPT | Performed by: STUDENT IN AN ORGANIZED HEALTH CARE EDUCATION/TRAINING PROGRAM

## 2025-02-11 PROCEDURE — 3074F SYST BP LT 130 MM HG: CPT | Performed by: STUDENT IN AN ORGANIZED HEALTH CARE EDUCATION/TRAINING PROGRAM

## 2025-02-11 RX ORDER — OMEPRAZOLE 40 MG/1
40 CAPSULE, DELAYED RELEASE ORAL
Qty: 90 CAPSULE | Refills: 3 | Status: SHIPPED | OUTPATIENT
Start: 2025-02-11

## 2025-02-11 ASSESSMENT — ENCOUNTER SYMPTOMS
ABDOMINAL PAIN: 0
RECTAL PAIN: 0
NAUSEA: 1
CONSTIPATION: 0
DIARRHEA: 0
VOMITING: 0

## 2025-02-11 NOTE — ASSESSMENT & PLAN NOTE
-I reviewed the colonoscopy report from 7/31/2024.  It showed several polyps, but no active bleeding.  -I reviewed ED provider notes/labs from December 2024.  Patient's fecal occult blood test was positive.  Hemoglobin was within normal limits.  -Patient does not recall having an EGD before.  -Will restart omeprazole as symptoms are not controlled.  I sent a prescription to his pharmacy.  -Will refer to GI  For follow-up.

## 2025-02-13 ENCOUNTER — OFFICE VISIT (OUTPATIENT)
Dept: GASTROENTEROLOGY | Facility: CLINIC | Age: 65
End: 2025-02-13
Payer: COMMERCIAL

## 2025-02-13 VITALS — HEART RATE: 113 BPM | OXYGEN SATURATION: 99 % | WEIGHT: 158 LBS | HEIGHT: 67 IN | BODY MASS INDEX: 24.8 KG/M2

## 2025-02-13 DIAGNOSIS — K92.2 GASTROINTESTINAL HEMORRHAGE, UNSPECIFIED GASTROINTESTINAL HEMORRHAGE TYPE: Primary | ICD-10-CM

## 2025-02-13 PROCEDURE — 99214 OFFICE O/P EST MOD 30 MIN: CPT | Performed by: INTERNAL MEDICINE

## 2025-02-13 PROCEDURE — 3008F BODY MASS INDEX DOCD: CPT | Performed by: INTERNAL MEDICINE

## 2025-02-13 NOTE — PROGRESS NOTES
"Subjective     History of Present Illness:   Mercedes Garner is a 64 y.o. male who presents to GI clinic for \"very dark stool.\" Went to ED.   Went off Xarelto and sotols normalized and then went back on it and stools darkened again.  Also feeling bloated, uncomfortable after eating.  Stopped drinking beer when this started. Used to drink 2/day and sometimes galss or two of wine.  Also used to take Aleve most days for back pain.    Also quit smoking (again) 2 months.    Review of Systems  Review of Systems    Social History   reports that he has never smoked. He has never used smokeless tobacco. He reports current alcohol use of about 1.0 standard drink of alcohol per week. He reports that he does not use drugs.     Allergies  Allergies   Allergen Reactions    Other Hives     TIDE laundry soap       Medications  Current Outpatient Medications   Medication Instructions    amLODIPine (NORVASC) 5 mg, oral, Daily    atorvastatin (LIPITOR) 40 mg, oral, Daily    olmesartan (BENICAR) 40 mg, oral, Daily    omeprazole (PRILOSEC) 40 mg, oral, Daily before breakfast, Do not crush or chew.    tadalafil (CIALIS) 5 mg, oral, Daily    tamsulosin (FLOMAX) 0.4 mg, oral, Nightly    Xarelto 10 mg, oral, Daily        Objective   Visit Vitals  Pulse (!) 113      Physical Exam  Constitutional:       Appearance: Normal appearance.   HENT:      Mouth/Throat:      Mouth: Mucous membranes are moist.      Pharynx: Oropharynx is clear.   Eyes:      Extraocular Movements: Extraocular movements intact.      Pupils: Pupils are equal, round, and reactive to light.   Cardiovascular:      Rate and Rhythm: Normal rate and regular rhythm.      Heart sounds: No murmur heard.  Pulmonary:      Effort: Pulmonary effort is normal.      Breath sounds: Normal breath sounds.   Abdominal:      General: Abdomen is flat. Bowel sounds are normal.      Palpations: Abdomen is soft. There is no mass.   Skin:     General: Skin is warm and dry.   Neurological:      " "Mental Status: He is alert.   Psychiatric:         Mood and Affect: Mood normal.         Behavior: Behavior normal.         Thought Content: Thought content normal.         Judgment: Judgment normal.                       Assessment/Plan   Mercedes Garner is a 64 y.o. male who presents to GI clinic for what sounds like intermittent very slow loss of blood in the GI tract.  Was guaiac positive in addition to the \"very dark\" stool which has been intermittent.  Striking that it resolved when he stopped his Xarelto and recurred when he resumed it.  Some confounding from the fact that he has intermittently used Pepto-Bismol but clearly he had the stool changes before that.  Possible risk factors for GI blood loss are his alcohol consumption (2 beers and sometimes a couple glasses of wine most days) which he has now stopped as well as his cigarette smoking which he says he is also now stopped.  Peptic ulcer, portal hypertensive related changes (nothing suggestive of a variceal bleed), neoplasia all possibilities.  AVMs, as well.  Recent colonoscopy with only small adenomas makes a colonic source unlikely.    Plan  EGD  If negative, capsule enteroscopy  CBC, iron studies, CMP.      Gregorio Aguilar MD         "

## 2025-02-14 ENCOUNTER — HOSPITAL ENCOUNTER (OUTPATIENT)
Dept: RADIOLOGY | Facility: CLINIC | Age: 65
Discharge: HOME | End: 2025-02-14
Payer: COMMERCIAL

## 2025-02-14 DIAGNOSIS — Z87.891 HISTORY OF SMOKING 30 OR MORE PACK YEARS: ICD-10-CM

## 2025-02-14 PROCEDURE — 71271 CT THORAX LUNG CANCER SCR C-: CPT

## 2025-02-15 LAB
ALBUMIN SERPL-MCNC: 4.6 G/DL (ref 3.6–5.1)
ALP SERPL-CCNC: 54 U/L (ref 35–144)
ALT SERPL-CCNC: 16 U/L (ref 9–46)
ANION GAP SERPL CALCULATED.4IONS-SCNC: 6 MMOL/L (CALC) (ref 7–17)
AST SERPL-CCNC: 16 U/L (ref 10–35)
BILIRUB SERPL-MCNC: 0.7 MG/DL (ref 0.2–1.2)
BUN SERPL-MCNC: 9 MG/DL (ref 7–25)
CALCIUM SERPL-MCNC: 9.6 MG/DL (ref 8.6–10.3)
CHLORIDE SERPL-SCNC: 106 MMOL/L (ref 98–110)
CO2 SERPL-SCNC: 29 MMOL/L (ref 20–32)
CREAT SERPL-MCNC: 0.85 MG/DL (ref 0.7–1.35)
EGFRCR SERPLBLD CKD-EPI 2021: 97 ML/MIN/1.73M2
ERYTHROCYTE [DISTWIDTH] IN BLOOD BY AUTOMATED COUNT: 11.8 % (ref 11–15)
GLUCOSE SERPL-MCNC: 113 MG/DL (ref 65–139)
HCT VFR BLD AUTO: 42.2 % (ref 38.5–50)
HGB BLD-MCNC: 13.7 G/DL (ref 13.2–17.1)
IRON SATN MFR SERPL: 33 % (CALC) (ref 20–48)
IRON SERPL-MCNC: 108 MCG/DL (ref 50–180)
MCH RBC QN AUTO: 34.9 PG (ref 27–33)
MCHC RBC AUTO-ENTMCNC: 32.5 G/DL (ref 32–36)
MCV RBC AUTO: 107.4 FL (ref 80–100)
PLATELET # BLD AUTO: 266 THOUSAND/UL (ref 140–400)
PMV BLD REES-ECKER: 9.8 FL (ref 7.5–12.5)
POTASSIUM SERPL-SCNC: 4.4 MMOL/L (ref 3.5–5.3)
PROT SERPL-MCNC: 6.9 G/DL (ref 6.1–8.1)
RBC # BLD AUTO: 3.93 MILLION/UL (ref 4.2–5.8)
SODIUM SERPL-SCNC: 141 MMOL/L (ref 135–146)
TIBC SERPL-MCNC: 326 MCG/DL (CALC) (ref 250–425)
WBC # BLD AUTO: 3.2 THOUSAND/UL (ref 3.8–10.8)

## 2025-02-25 ENCOUNTER — HOSPITAL ENCOUNTER (OUTPATIENT)
Dept: RADIOLOGY | Facility: CLINIC | Age: 65
Discharge: HOME | End: 2025-02-25
Payer: COMMERCIAL

## 2025-02-25 ENCOUNTER — APPOINTMENT (OUTPATIENT)
Dept: ORTHOPEDIC SURGERY | Facility: CLINIC | Age: 65
End: 2025-02-25
Payer: COMMERCIAL

## 2025-02-25 DIAGNOSIS — M54.16 LUMBAR BACK PAIN WITH RADICULOPATHY AFFECTING RIGHT LOWER EXTREMITY: ICD-10-CM

## 2025-02-25 DIAGNOSIS — M47.816 LUMBAR SPONDYLOSIS: ICD-10-CM

## 2025-02-25 DIAGNOSIS — M54.16 LUMBAR RADICULOPATHY: ICD-10-CM

## 2025-02-25 DIAGNOSIS — M43.16 SPONDYLOLISTHESIS OF LUMBAR REGION: ICD-10-CM

## 2025-02-25 DIAGNOSIS — M51.361 DEGENERATION OF INTERVERTEBRAL DISC OF LUMBAR REGION WITH LOWER EXTREMITY PAIN: Primary | ICD-10-CM

## 2025-02-25 PROCEDURE — 99204 OFFICE O/P NEW MOD 45 MIN: CPT

## 2025-02-25 PROCEDURE — 72110 X-RAY EXAM L-2 SPINE 4/>VWS: CPT

## 2025-02-25 RX ORDER — GABAPENTIN 100 MG/1
100 CAPSULE ORAL 3 TIMES DAILY
Qty: 90 CAPSULE | Refills: 2 | Status: SHIPPED | OUTPATIENT
Start: 2025-02-25 | End: 2025-05-26

## 2025-02-25 ASSESSMENT — PAIN - FUNCTIONAL ASSESSMENT: PAIN_FUNCTIONAL_ASSESSMENT: 0-10

## 2025-02-25 ASSESSMENT — PAIN SCALES - GENERAL: PAINLEVEL_OUTOF10: 5 - MODERATE PAIN

## 2025-02-25 NOTE — PROGRESS NOTES
HPI:  Mercedes Garner is a 64-year-old male who presents today with back pain for years and a 6-month history of lumbar radicular symptoms.  Right leg worse than the left.  His right leg pain travels down the posterior leg into the bottom of the foot.  His left radicular symptoms are just in the bottom of the left foot.  Numbness and tingling comes and goes.  Pain comes and goes.  He has tried Aleve which helps somewhat.  Has done physical therapy through [facility] in [place].  Sometimes the exercises make his back pain worse.  No other questions or concerns at time of visit.    ROS:  Reviewed on EMR and patient intake sheet.    PMH/SH:  Reviewed on EMR and patient intake sheet.    Exam:  MSK: Full strength and range of motion of lower extremities bilaterally.  Negative straight leg raise bilaterally.  General: No acute distress. Awake and conversant.  Eyes: Normal conjunctiva, anicteric. Round symmetric pupils.  ENT: Hearing grossly intact. No nasal discharge.  Neck: Neck is supple. No masses or thyromegaly.  Respiratory: Respirations are non-labored. No wheezing.  Skin: Warm. No rashes or ulcers.  Psych: Alert and oriented. Cooperative, appropriate mood and affect, normal judgement.  CV: No lower extremity edema.  Neuro: Sensation and CN II-XII grossly normal.    Radiology:     Lumbar x-rays personally reviewed and demonstrate anterolisthesis of L3 on L4.  Retrolisthesis of L4 on L5.  Moderate this degeneration at L5/S1.  Moderate spondylosis L3-S1.    Diagnosis:    Lumbar radiculopathy  Lumbar spondylolisthesis  Lumbar spondylosis  Degenerative disease, lumbar    Assessment and Plan:  Patient was seen today in evaluate for ongoing lumbar radicular symptoms and low back pain despite physical therapy and over-the-counter pain medications.  At this time, I prescribed gabapentin 100 mg 3 times daily.  I referred him to pain management for epidural injections.  If his symptoms do not improve with these  treatment alternatives, I recommended follow-up and MRI.  He may ambulate, exercise as tolerated.  We discussed limiting excessive weighted flexion/extension and axial loading of the spine.  Patient feels all questions were answered today.  Patient agrees to the plan above.    **This note was dictated using speech recognition software and was not corrected for spelling or grammatical errors**    Brett Blanc PA-C  Department of Orthopaedic Surgery  11:42 AM  02/25/25    [address] [place] [address]    Voicemail: [phone]   Appts: [phone]  Fax: [phone]

## 2025-04-03 ENCOUNTER — OFFICE VISIT (OUTPATIENT)
Dept: PAIN MEDICINE | Facility: HOSPITAL | Age: 65
End: 2025-04-03
Payer: COMMERCIAL

## 2025-04-03 DIAGNOSIS — M54.16 LUMBAR RADICULOPATHY: ICD-10-CM

## 2025-04-03 PROCEDURE — 1036F TOBACCO NON-USER: CPT | Performed by: ANESTHESIOLOGY

## 2025-04-03 PROCEDURE — 99214 OFFICE O/P EST MOD 30 MIN: CPT | Performed by: ANESTHESIOLOGY

## 2025-04-03 PROCEDURE — 99204 OFFICE O/P NEW MOD 45 MIN: CPT | Performed by: ANESTHESIOLOGY

## 2025-04-03 ASSESSMENT — PAIN - FUNCTIONAL ASSESSMENT: PAIN_FUNCTIONAL_ASSESSMENT: 0-10

## 2025-04-03 ASSESSMENT — PAIN SCALES - GENERAL: PAINLEVEL_OUTOF10: 6

## 2025-04-03 NOTE — PROGRESS NOTES
Chief Complaint   Patient presents with    Back Pain     65 y/o male c/o back and leg pain      HPI   Mr. Garner is a 64-year-old male with a history of of back and leg symptoms who is referred by Brett Blanc with orthopedic surgery.  The patient says that this pain has been ongoing since 1985 when he may have $5 but with someone at the gym involving a Nautilus machine.  The patient says that after that he had sudden onset of back and leg symptoms which he had not had before and did have a workup at that time which showed an issue with a disc at L4-5.  The patient says in the past the pain would come and go and he would have episodes or flares once a year or every 6 months but now the pain is more persistent.  He can have pains at range up to an 8 out of 10.  The patient says that he retired in 2022.  He has done physical therapy and see neurology.  He says that his pain in general is worse with sitting and he notes his back will pop at times which sometimes can relieve the symptoms short-term.  He also notes that the pain can be improved with stretching, doing the exercises given at formal physical therapy, and changing positions.  He was taking Aleve but he thinks that caused an ulcer and he has an upcoming upper GI study that is going to be done, scope.  He was prescribed gabapentin but has not tried it because he was concerned about it causing further problems with his stomach.  The patient recently did formal physical therapy which was completed in September 2024 and he says he has continued the physician directed exercises daily until last month when his pain was getting worse, he felt like he might have been overdoing it with the therapy.    The pain is rather constant in the right low back, buttock, and lateral calf into the lateral foot and bottom of the foot.  The pain in the back, hip and upper leg is more burning and the pain distally in the leg is more tingling and numb in nature.    Patient does  note prior shoulder injections with Dr. Parker for frozen shoulder which completely alleviated his symptoms but they were very painful and he was concerned at that injections for his back will be painful as well.    ROS: 13 point review of systems is complete and is negative listed above in HPI    Past Medical History:   Diagnosis Date    Atypical chest pain 07/18/2023    Black stool 07/18/2023    BPH (benign prostatic hyperplasia)     Family history of hypercoagulability     Gastro-esophageal reflux disease without esophagitis 03/22/2021    GERD without esophagitis    Hyperlipidemia     Hypertension     Insulin resistance     Personal history of other specified conditions 03/22/2021    History of weight loss    Pulmonary embolus     Pulmonary nodules        Past Surgical History:   Procedure Laterality Date    ANTERIOR CRUCIATE LIGAMENT REPAIR  1996    sports related       Family History   Problem Relation Name Age of Onset    Diabetes Mother      Hypertension Mother      Heart failure Mother      Blood clot Mother      Liver cancer Father      Diabetes Father      Hypertension Father      No Known Problems Sister      Thyroid disease Daughter          thyroidectomy (materal side)    No Known Problems Daughter      No Known Problems Grandchild 4         [place]       Social History     Tobacco Use    Smoking status: Never    Smokeless tobacco: Never    Tobacco comments:     Quit 2 years ago - 20-30 pk yr history.  On and off throughout adult life    Vaping Use    Vaping status: Never Used   Substance Use Topics    Alcohol use: Yes     Alcohol/week: 1.0 standard drink of alcohol     Types: 1 Cans of beer per week     Comment: 1 per day    Drug use: Never       Current Outpatient Medications on File Prior to Visit   Medication Sig Dispense Refill    amLODIPine (Norvasc) 5 mg tablet Take 1 tablet (5 mg) by mouth once daily. 90 tablet 3    atorvastatin (Lipitor) 40 mg tablet Take 1 tablet (40 mg) by mouth once  daily. 90 tablet 3    gabapentin (Neurontin) 100 mg capsule Take 1 capsule (100 mg) by mouth 3 times a day. 90 capsule 2    olmesartan (BENIcar) 40 mg tablet Take 1 tablet (40 mg) by mouth once daily. 90 tablet 3    omeprazole (PriLOSEC) 40 mg DR capsule Take 1 capsule (40 mg) by mouth once daily in the morning. Take before meals. Do not crush or chew. 90 capsule 3    tadalafil (Cialis) 5 mg tablet Take 1 tablet (5 mg) by mouth once daily. 90 tablet 3    tamsulosin (Flomax) 0.4 mg 24 hr capsule Take 1 capsule (0.4 mg) by mouth once daily at bedtime. 90 capsule 3    Xarelto 10 mg tablet Take 1 tablet (10 mg) by mouth once daily. 90 tablet 3     No current facility-administered medications on file prior to visit.        Allergies   Allergen Reactions    Other Hives     TIDE laundry soap          Imaging:  Narrative & Impression   Interpreted By:  Lisa Burt,   STUDY:  Lumbar Spine, 4 views.      INDICATION:  Signs/Symptoms:lumbar pain.      COMPARISON:  12/18/2023.      ACCESSION NUMBER(S):  [id number]      ORDERING CLINICIAN:  FATUMA OLIVAS      FINDINGS:  Grade 1 L3-4 anterolisthesis. Grade 1 L4-5 retrolisthesis.  Mild levoscoliosis centered in the mid lumbar region.  Moderate discogenic and facet degenerative changes at L5-S1.  Mild facet degenerative changes at L3-4 and L4-5.      No dynamic instability on flexion/extension views.  Vertebral body heights are preserved. Posterior elements are intact.      IMPRESSION:  1. As above.         Physical Exam:  Gen.: No acute distress, pleasant  Eyes: Pupils equal  ENT: Hearing is without noted deficit  Respiratory: No gasping or shortness of breath   Cardiovascular: Extremity show no edema  Skin: No rashes or open lesions or ulcers identified on the skin  Musculoskeletal: Gait is grossly normal, positive SLR on right side only.  Intact strength and sensation in bilateral lower extremities, normal reflexes.  Aman on right causes some mild groin pain.  Neurologic:  Cranial nerves II through XII are grossly intact  Psychiatric:  Patient is alert and oriented x3    Impression/Plan:  64-year-old male with a history of low back and right leg pain.  Patient has most significant degenerative changes at L4-5 and L5-S1.  We discussed potential for further treatment.  Patient referred by Ortho surgery.    -Will plan for right biased L5-S1 intralaminar.  Procedure, risk, benefits, and alternatives reviewed with the patient.    -Encouraged to keep up with physical therapy and core strengthening.    -May consider an MRI in the future if we do not see the relief that he is looking for with the aforementioned treatment plan.    -Patient was prescribed gabapentin by Brett Blanc with orthopedic spine surgery.  We discussed the side effect profile and the medication itself.  The patient did have concern about causing further ulcer but we discussed that this would not be a typical side effect and should not cause any issues with his stomach from that standpoint.  We did discuss the typical side effects and risks.  He was prescribed low-dose gabapentin of 100 mg and we discussed that we could ramp it up slowly and if discontinued later would wean it down slowly.  Right now he was just prescribed 100 mg 3 times daily.    Of note Xarelto was on his list but he says he has been off of his medication for the past month or more.

## 2025-04-07 ENCOUNTER — APPOINTMENT (OUTPATIENT)
Dept: GASTROENTEROLOGY | Facility: EXTERNAL LOCATION | Age: 65
End: 2025-04-07
Payer: COMMERCIAL

## 2025-04-07 DIAGNOSIS — K29.41: Primary | ICD-10-CM

## 2025-04-07 DIAGNOSIS — K92.2 GASTROINTESTINAL HEMORRHAGE, UNSPECIFIED GASTROINTESTINAL HEMORRHAGE TYPE: ICD-10-CM

## 2025-04-07 DIAGNOSIS — K92.2 GASTROINTESTINAL HEMORRHAGE, UNSPECIFIED GASTROINTESTINAL HEMORRHAGE TYPE: Primary | ICD-10-CM

## 2025-04-07 DIAGNOSIS — K29.30 SUPERFICIAL GASTRITIS WITHOUT HEMORRHAGE, UNSPECIFIED CHRONICITY: ICD-10-CM

## 2025-04-07 PROCEDURE — 43239 EGD BIOPSY SINGLE/MULTIPLE: CPT | Performed by: INTERNAL MEDICINE

## 2025-04-07 PROCEDURE — 88305 TISSUE EXAM BY PATHOLOGIST: CPT

## 2025-04-07 PROCEDURE — 88305 TISSUE EXAM BY PATHOLOGIST: CPT | Performed by: PATHOLOGY

## 2025-04-07 PROCEDURE — 0753T DGTZ GLS MCRSCP SLD LEVEL IV: CPT

## 2025-04-09 ENCOUNTER — LAB REQUISITION (OUTPATIENT)
Dept: LAB | Facility: HOSPITAL | Age: 65
End: 2025-04-09
Payer: COMMERCIAL

## 2025-05-08 ENCOUNTER — HOSPITAL ENCOUNTER (OUTPATIENT)
Dept: GASTROENTEROLOGY | Facility: HOSPITAL | Age: 65
Discharge: HOME | End: 2025-05-08
Payer: COMMERCIAL

## 2025-05-08 DIAGNOSIS — K92.2 GASTROINTESTINAL HEMORRHAGE, UNSPECIFIED GASTROINTESTINAL HEMORRHAGE TYPE: ICD-10-CM

## 2025-05-08 PROCEDURE — 91110 GI TRC IMG INTRAL ESOPH-ILE: CPT

## 2025-05-12 ENCOUNTER — TELEPHONE (OUTPATIENT)
Dept: GASTROENTEROLOGY | Facility: CLINIC | Age: 65
End: 2025-05-12
Payer: COMMERCIAL

## 2025-05-12 NOTE — TELEPHONE ENCOUNTER
Guanakito Lyman,. Mercedes Aguilar called stating he has not passed the pill from his capsule endoscopy and is concerned. Please advise.      Thank You

## 2025-05-12 NOTE — TELEPHONE ENCOUNTER
CM completed call with cousin to inform him that patient has been accepted to [facility]. Cousin is accepting of the referral and CM will organize transport.     Russ Horton (Cousin)   [phone]       He feels fine.  Capsule reading pending.

## 2025-06-09 NOTE — ED TRIAGE NOTES
Pt c/o sore throat, sinus congestion, right ear pain, body aches and nausea for the past week.    There is conflicting documentation in the medical record arising from the documentation of different providers.     In order to ensure accurate coding and accuracy of the clinical record, the documentation in this patient’s medical record requires additional clarification from the Attending of Record.    Based upon the patient’s presentation, evaluation, and medical management, please identify the appropriate diagnosis for this patient:     •	Moderate protein-calorie malnutrition  •	Severe protein-calorie malnutrition  •	Other (specify)    Conflicting documentation and/or clinical evidence is noted:     The patient received a nutrition assessment by a Registered Dietician on 5/18/2025.   The documentation of this assessment states: Correction: Pt meets criteria for moderate malnutrition in context of chronic illness  r/t decreased ability to meet increased nutrient needs 2/2 PI, advanced age AEB moderate muscle/fat wasting, <75% ENN x >1 mo    Discharge provider note states:   Severe protein-calorie malnutrition  -Nutrition consult appreciated  -Add supplements    For reference, please see the [facility] Malnutrition Policy # [id number], Policy Title: [facility]’s Malnutrition Identification & Documentation located on the [facility] Intranet

## 2025-06-12 DIAGNOSIS — I10 ESSENTIAL HYPERTENSION: ICD-10-CM

## 2025-06-12 DIAGNOSIS — E78.5 DYSLIPIDEMIA: ICD-10-CM

## 2025-06-12 RX ORDER — ATORVASTATIN CALCIUM 40 MG/1
40 TABLET, FILM COATED ORAL DAILY
Qty: 90 TABLET | Refills: 3 | Status: SHIPPED | OUTPATIENT
Start: 2025-06-12

## 2025-06-12 RX ORDER — AMLODIPINE BESYLATE 5 MG/1
5 TABLET ORAL DAILY
Qty: 90 TABLET | Refills: 3 | Status: SHIPPED | OUTPATIENT
Start: 2025-06-12

## 2025-06-12 RX ORDER — OLMESARTAN MEDOXOMIL 40 MG/1
40 TABLET ORAL DAILY
Qty: 90 TABLET | Refills: 3 | Status: SHIPPED | OUTPATIENT
Start: 2025-06-12

## 2025-07-25 DIAGNOSIS — N40.1 BENIGN PROSTATIC HYPERPLASIA WITH LOWER URINARY TRACT SYMPTOMS, SYMPTOM DETAILS UNSPECIFIED: ICD-10-CM

## 2025-07-25 RX ORDER — TAMSULOSIN HYDROCHLORIDE 0.4 MG/1
0.4 CAPSULE ORAL NIGHTLY
Qty: 90 CAPSULE | Refills: 3 | Status: SHIPPED | OUTPATIENT
Start: 2025-07-25

## 2025-07-28 PROBLEM — M54.30 SCIATICA: Status: RESOLVED | Noted: 2023-07-18 | Resolved: 2025-07-28

## 2025-07-28 PROBLEM — Z87.891 HISTORY OF SMOKING 30 OR MORE PACK YEARS: Status: ACTIVE | Noted: 2025-07-28

## 2025-07-28 ASSESSMENT — ENCOUNTER SYMPTOMS
FEVER: 0
CHILLS: 0
CONSTIPATION: 0
SHORTNESS OF BREATH: 0
DYSURIA: 0
VOMITING: 0
CHEST TIGHTNESS: 0
NAUSEA: 1
ABDOMINAL PAIN: 0
DIARRHEA: 0
DIFFICULTY URINATING: 0
RECTAL PAIN: 0

## 2025-07-28 NOTE — PROGRESS NOTES
Subjective     Patient ID: Mercedes Garner is a 65 y.o. male       Initial PCP history 12/10/2024:  This is the first time I am meeting the patient.  Patient presents for ED follow-up, hypertension, preventative health care.  Patient went to the ED on 11/20/2024 for throat pain.  He was hemodynamically stable, influenza negative, COVID-negative.  At that time, ED provider found that the patient has tonsillar exudates and cervical adenopathy.  You recovered over the past week with antibiotics.  Patient no longer complains of sore throat or pain.  He is due for his Tdap today.      Interval updates 2/11/2025:  Patient has noticed dark stools for the past several months.  He also feels early satiety and bloating.  Patient denies fevers, chills, night sweats, unintentional weight loss, hemoptysis, hematemesis, hematochezia, easy bleeding/bruising, iron supplements,.  He does take Pepto-Bismol rarely.  He went to the emergency department December 2024 for similar issue.  His fecal occult blood was positive.  His last colonoscopy was September 2024 which did not show active bleeding.        Pertienent Social History: Started smoking cigerettes at the age 18. Smoked average of 1/2-1-pack-per day. Quit smoking 2-years ago.     Initial PCP History 7/30/2025:  Patient is here for his first MAWV. Patient denies chest pain/pressure/tightness, shortness of breath, orthopnea, paroxysmal nocturnal dyspnea, lower limb edema, blurred vision, lightheadedness/dizziness, presyncope, syncope.  -First Shingrix Given in office 7/30/2025.    Review of Systems   Constitutional:  Negative for chills and fever.   Respiratory:  Negative for chest tightness and shortness of breath.    Cardiovascular:  Negative for chest pain.   Gastrointestinal:  Positive for nausea. Negative for abdominal pain, constipation, diarrhea, rectal pain and vomiting.   Genitourinary:  Negative for difficulty urinating and dysuria.       /75 (BP Location: Left  "arm, Patient Position: Sitting, BP Cuff Size: Adult)   Pulse 69   Temp 36.3 °C (97.4 °F) (Temporal)   Ht 1.702 m (5' 7\")   Wt 73.8 kg (162 lb 9.6 oz)   SpO2 99%   BMI 25.47 kg/m²      Objective   Physical Exam  Vitals reviewed.   Constitutional:       General: He is not in acute distress.  HENT:      Head: Normocephalic.      Right Ear: External ear normal.      Left Ear: External ear normal.      Nose: No rhinorrhea.      Mouth/Throat:      Mouth: Mucous membranes are moist.     Eyes:      Conjunctiva/sclera: Conjunctivae normal.       Cardiovascular:      Rate and Rhythm: Normal rate and regular rhythm.      Heart sounds: No murmur heard.     No friction rub. No gallop.   Pulmonary:      Effort: No respiratory distress.      Breath sounds: No wheezing, rhonchi or rales.   Abdominal:      General: Bowel sounds are normal. There is no distension.      Palpations: Abdomen is soft.      Tenderness: There is no abdominal tenderness.     Musculoskeletal:      Cervical back: Neck supple.      Right lower leg: No edema.      Left lower leg: No edema.     Skin:     General: Skin is warm and dry.      Capillary Refill: Capillary refill takes less than 2 seconds.     Neurological:      Mental Status: He is alert.      Gait: Gait normal.           Labs:   Lab Results   Component Value Date    WBC 3.2 (L) 02/14/2025    HGB 13.7 02/14/2025    HCT 42.2 02/14/2025     02/14/2025    TSH 1.15 09/21/2023    PSA 0.27 09/21/2023    INR 1.6 (H) 12/24/2024     Lab Results   Component Value Date     02/14/2025    K 4.4 02/14/2025     02/14/2025    BUN 9 02/14/2025    CREATININE 0.85 02/14/2025    GLUCOSE 113 02/14/2025    CALCIUM 9.6 02/14/2025    PROT 6.9 02/14/2025    BILITOT 0.7 02/14/2025    ALKPHOS 54 02/14/2025    AST 16 02/14/2025    ALT 16 02/14/2025     Lab Results   Component Value Date    CHOL 106 06/19/2024    CHOL 225 (H) 09/21/2023    CHOL 196 08/11/2022      Lab Results   Component Value Date    " "TRIG 55 06/19/2024    TRIG 97 09/21/2023    TRIG 78 08/11/2022      Lab Results   Component Value Date    HDL 54.3 06/19/2024    HDL 63.4 09/21/2023    HDL 55.1 08/11/2022     Lab Results   Component Value Date    LDLCALC 41 06/19/2024      Lab Results   Component Value Date    VLDL 11 06/19/2024    VLDL 19 09/21/2023    VLDL 16 08/11/2022    No components found for: \"[id number]\"    Imaging/Testing: Capsule Endoscopy Small Intestine  Reason for Referral   Gastrointestinal hemorrhage, unspecified gastrointestinal hemorrhage type   [ K92. 2]     Patient Data   Gastric passage time: 0h 7m, Small bowel passage time: 3h 36m     Procedure Information and Findings   The video was not downloaded until 5/14/25 for review, for unknown   reasons.   The quality of the preparation was excellent.   There were multiple patchy areas of erythema and a few erosions with   overlying hematin noted in the stomach.   The visualized small bowel appeared normal.   There was no blood noted in the small bowel.   No angioectasias, erosions, ulcerations, strictures, or masses were   visualized in the small bowel.   Green stool was noted in the colon.     Recommendations   Follow up with referring gastroenterologist, Gregorio Aguilar MD.   Follow up with primary care physician, Daisy Figueroa MD.    Electronically signed by Rafael Castro, 5/14/2025 3:32 PM      Problem List Items Addressed This Visit          Medium    Family history of hypercoagulability    BPH (benign prostatic hyperplasia)    Current Assessment & Plan   -Patient has had MRI prostate in the past.  -Will continue to monitor PSA levels.         Relevant Medications    tamsulosin (Flomax) 0.4 mg 24 hr capsule    Hyperlipidemia    Current Assessment & Plan   CT calcium score 0 in 2019.   Discussed goal of non-HDL cholesterol less than 100 with respect to his history of hypertension and elevated blood sugar.  Patient is now within goal with atorvastatin 40 mg, which he will " continue.         Relevant Medications    atorvastatin (Lipitor) 40 mg tablet    Essential hypertension    Current Assessment & Plan   -BP Goal <130//90  -Hold Blood Pressure Medications if SBP <90 mmHg or HR <60 bpm.  -Patient and I discussed dietary and lifestyle modifications, including DASH diet.  -Will continue to monitor for complications of HTN.  -Will continue to monitor for medication adverse effects.            Relevant Medications    tamsulosin (Flomax) 0.4 mg 24 hr capsule    olmesartan (BENIcar) 40 mg tablet    amLODIPine (Norvasc) 5 mg tablet    Prediabetes    Current Assessment & Plan   -As indicated by my A1c of 5.9.  -Patient has been able to lower his A1c with dietary and lifestyle modifications.  -Will continue ARB for main indication of hypertension.  -Will obtain updated A1c.         History of colonic polyps    Current Assessment & Plan   -Patient and I discussed the GI recommendations of his colonoscopy performed June 2024.  There was 1 tubular adenoma, so the recommendation is for repeat colonoscopy June 2029.           Gastrointestinal hemorrhage    Current Assessment & Plan   -I reviewed the colonoscopy report from 7/31/2024.  It showed several polyps, but no active bleeding.  -I reviewed ED provider notes/labs from December 2024.  Patient's fecal occult blood test was positive.  Hemoglobin was within normal limits.  -Patient does not recall having an EGD before.  -Will restart omeprazole as symptoms are not controlled.  I sent a prescription to his pharmacy.  -Will refer to GI  For follow-up.         History of smoking 30 or more pack years    Current Assessment & Plan   -Started smoking cigerettes at the age 18. Smoked average of 1/2-1-pack-per day. Quit smoking 2-years ago.   -Continue to monitor.          Relevant Orders    CT lung screening low dose    Pulmonary embolus    Current Assessment & Plan   Mercedes has a personal history of blood clot and a family history of  hypercoagulability. This could have been an unprovoked PE, based on patient reports.  He only had once VTE total in 2020.   -I did a fall risk assessment of patient, and he is not high risk.  -Given risk vs. Benefits, patient opts against continued Xarelto.          Lumbar radiculopathy    Current Assessment & Plan   -I reviewed orthopedic surgery and pain management notes and diagnostic studies.  -Can continue Gabapentin 100mg TID for pain. Patient feels like it has helped him a lot.  -He would like to hold off on injections.          Iron deficiency anemia    Current Assessment & Plan   -I reviewed GI notes and diagnostic studies.  -Patient had EGD, Colonoscopy and capsule studies (2025) done. No signs of active bleeding.   -Hemoglobin is stable.  -Continue to monitor.           Other Visit Diagnoses         Routine general medical examination at health care facility    -  Primary      Dyslipidemia        Relevant Medications    atorvastatin (Lipitor) 40 mg tablet      Screening for AAA (abdominal aortic aneurysm)        Relevant Orders    Vascular US Abdominal Aorta Aneurysm AAA Screening      Need for vaccination with 20-polyvalent pneumococcal conjugate vaccine        Relevant Orders    Pneumococcal conjugate vaccine, 20-valent (PREVNAR 20) (Completed)      Need for zoster vaccine        Relevant Orders    Zoster vaccine, recombinant, adult (SHINGRIX) (Completed)                [unfilled]    Patient and I discussed diet/nutrition, lifestyle modifications, safety, medication indications and side effects, and health goals.    current treatment plan is effective, no change in therapy, orders and follow up as documented in EMR, lab results reviewed with patient, repeat labs ordered prior to next appointment, reviewed compliance with lifestyle measures, reviewed diet, exercise and weight control, reviewed medications and side effects in detail         I have reviewed OARRS report, consistent with prescribed  medication. I have considered risks of abuse, diversion, side effects and feel clinically benefit of medication outweighs risks at this time.

## 2025-07-28 NOTE — ASSESSMENT & PLAN NOTE
Mercedes has a personal history of blood clot and a family history of hypercoagulability. This could have been an unprovoked PE, based on patient reports.  He only had once VTE total in 2020.   -I did a fall risk assessment of patient, and he is not high risk.  -Given risk vs. Benefits, patient opts against continued Xarelto.

## 2025-07-28 NOTE — ASSESSMENT & PLAN NOTE
-BP Goal <130//90  -Hold Blood Pressure Medications if SBP <90 mmHg or HR <60 bpm.  -Patient and I discussed dietary and lifestyle modifications, including DASH diet.  -Will continue to monitor for complications of HTN.  -Will continue to monitor for medication adverse effects.

## 2025-07-28 NOTE — ASSESSMENT & PLAN NOTE
-Started smoking cigerettes at the age 18. Smoked average of 1/2-1-pack-per day. Quit smoking 2-years ago.   -Continue to monitor.

## 2025-07-30 ENCOUNTER — APPOINTMENT (OUTPATIENT)
Dept: PRIMARY CARE | Facility: CLINIC | Age: 65
End: 2025-07-30
Payer: COMMERCIAL

## 2025-07-30 VITALS
WEIGHT: 162.6 LBS | DIASTOLIC BLOOD PRESSURE: 75 MMHG | OXYGEN SATURATION: 99 % | HEART RATE: 69 BPM | TEMPERATURE: 97.4 F | HEIGHT: 67 IN | SYSTOLIC BLOOD PRESSURE: 127 MMHG | BODY MASS INDEX: 25.52 KG/M2

## 2025-07-30 DIAGNOSIS — E78.5 HYPERLIPIDEMIA, UNSPECIFIED HYPERLIPIDEMIA TYPE: ICD-10-CM

## 2025-07-30 DIAGNOSIS — R73.03 PREDIABETES: ICD-10-CM

## 2025-07-30 DIAGNOSIS — E78.5 DYSLIPIDEMIA: ICD-10-CM

## 2025-07-30 DIAGNOSIS — Z87.891 HISTORY OF SMOKING 30 OR MORE PACK YEARS: ICD-10-CM

## 2025-07-30 DIAGNOSIS — Z00.00 ROUTINE GENERAL MEDICAL EXAMINATION AT HEALTH CARE FACILITY: Primary | ICD-10-CM

## 2025-07-30 DIAGNOSIS — K92.2 GASTROINTESTINAL HEMORRHAGE, UNSPECIFIED GASTROINTESTINAL HEMORRHAGE TYPE: ICD-10-CM

## 2025-07-30 DIAGNOSIS — Z23 NEED FOR ZOSTER VACCINE: ICD-10-CM

## 2025-07-30 DIAGNOSIS — D50.0 IRON DEFICIENCY ANEMIA DUE TO CHRONIC BLOOD LOSS: ICD-10-CM

## 2025-07-30 DIAGNOSIS — I26.93 SINGLE SUBSEGMENTAL PULMONARY EMBOLISM WITHOUT ACUTE COR PULMONALE: ICD-10-CM

## 2025-07-30 DIAGNOSIS — N40.1 BENIGN PROSTATIC HYPERPLASIA WITH LOWER URINARY TRACT SYMPTOMS, SYMPTOM DETAILS UNSPECIFIED: ICD-10-CM

## 2025-07-30 DIAGNOSIS — N40.0 BENIGN PROSTATIC HYPERPLASIA WITHOUT LOWER URINARY TRACT SYMPTOMS: ICD-10-CM

## 2025-07-30 DIAGNOSIS — Z83.2 FAMILY HISTORY OF HYPERCOAGULABILITY: ICD-10-CM

## 2025-07-30 DIAGNOSIS — I10 ESSENTIAL HYPERTENSION: ICD-10-CM

## 2025-07-30 DIAGNOSIS — Z86.0100 HISTORY OF COLONIC POLYPS: ICD-10-CM

## 2025-07-30 DIAGNOSIS — Z23 NEED FOR VACCINATION WITH 20-POLYVALENT PNEUMOCOCCAL CONJUGATE VACCINE: ICD-10-CM

## 2025-07-30 DIAGNOSIS — M54.16 LUMBAR RADICULOPATHY: ICD-10-CM

## 2025-07-30 DIAGNOSIS — Z13.6 SCREENING FOR AAA (ABDOMINAL AORTIC ANEURYSM): ICD-10-CM

## 2025-07-30 PROBLEM — D50.9 IRON DEFICIENCY ANEMIA: Status: ACTIVE | Noted: 2025-07-30

## 2025-07-30 PROCEDURE — 3008F BODY MASS INDEX DOCD: CPT | Performed by: STUDENT IN AN ORGANIZED HEALTH CARE EDUCATION/TRAINING PROGRAM

## 2025-07-30 PROCEDURE — 1170F FXNL STATUS ASSESSED: CPT | Performed by: STUDENT IN AN ORGANIZED HEALTH CARE EDUCATION/TRAINING PROGRAM

## 2025-07-30 PROCEDURE — 90677 PCV20 VACCINE IM: CPT | Performed by: STUDENT IN AN ORGANIZED HEALTH CARE EDUCATION/TRAINING PROGRAM

## 2025-07-30 PROCEDURE — 1159F MED LIST DOCD IN RCRD: CPT | Performed by: STUDENT IN AN ORGANIZED HEALTH CARE EDUCATION/TRAINING PROGRAM

## 2025-07-30 PROCEDURE — 1123F ACP DISCUSS/DSCN MKR DOCD: CPT | Performed by: STUDENT IN AN ORGANIZED HEALTH CARE EDUCATION/TRAINING PROGRAM

## 2025-07-30 PROCEDURE — 3074F SYST BP LT 130 MM HG: CPT | Performed by: STUDENT IN AN ORGANIZED HEALTH CARE EDUCATION/TRAINING PROGRAM

## 2025-07-30 PROCEDURE — G0009 ADMIN PNEUMOCOCCAL VACCINE: HCPCS | Performed by: STUDENT IN AN ORGANIZED HEALTH CARE EDUCATION/TRAINING PROGRAM

## 2025-07-30 PROCEDURE — 1160F RVW MEDS BY RX/DR IN RCRD: CPT | Performed by: STUDENT IN AN ORGANIZED HEALTH CARE EDUCATION/TRAINING PROGRAM

## 2025-07-30 PROCEDURE — 3078F DIAST BP <80 MM HG: CPT | Performed by: STUDENT IN AN ORGANIZED HEALTH CARE EDUCATION/TRAINING PROGRAM

## 2025-07-30 PROCEDURE — 1158F ADVNC CARE PLAN TLK DOCD: CPT | Performed by: STUDENT IN AN ORGANIZED HEALTH CARE EDUCATION/TRAINING PROGRAM

## 2025-07-30 PROCEDURE — 90750 HZV VACC RECOMBINANT IM: CPT | Performed by: STUDENT IN AN ORGANIZED HEALTH CARE EDUCATION/TRAINING PROGRAM

## 2025-07-30 PROCEDURE — G0438 PPPS, INITIAL VISIT: HCPCS | Performed by: STUDENT IN AN ORGANIZED HEALTH CARE EDUCATION/TRAINING PROGRAM

## 2025-07-30 RX ORDER — AMLODIPINE BESYLATE 5 MG/1
5 TABLET ORAL DAILY
Qty: 90 TABLET | Refills: 3 | Status: SHIPPED | OUTPATIENT
Start: 2025-07-30

## 2025-07-30 RX ORDER — TAMSULOSIN HYDROCHLORIDE 0.4 MG/1
0.4 CAPSULE ORAL NIGHTLY
Qty: 90 CAPSULE | Refills: 3 | Status: SHIPPED | OUTPATIENT
Start: 2025-07-30

## 2025-07-30 RX ORDER — OLMESARTAN MEDOXOMIL 40 MG/1
40 TABLET ORAL DAILY
Qty: 90 TABLET | Refills: 3 | Status: SHIPPED | OUTPATIENT
Start: 2025-07-30

## 2025-07-30 RX ORDER — ATORVASTATIN CALCIUM 40 MG/1
40 TABLET, FILM COATED ORAL DAILY
Qty: 90 TABLET | Refills: 3 | Status: SHIPPED | OUTPATIENT
Start: 2025-07-30

## 2025-07-30 ASSESSMENT — ENCOUNTER SYMPTOMS
OCCASIONAL FEELINGS OF UNSTEADINESS: 0
DEPRESSION: 0
LOSS OF SENSATION IN FEET: 0

## 2025-07-30 ASSESSMENT — ACTIVITIES OF DAILY LIVING (ADL)
TAKING_MEDICATION: INDEPENDENT
DRESSING: INDEPENDENT
MANAGING_FINANCES: INDEPENDENT
BATHING: INDEPENDENT
GROCERY_SHOPPING: INDEPENDENT
DOING_HOUSEWORK: INDEPENDENT

## 2025-07-30 ASSESSMENT — PATIENT HEALTH QUESTIONNAIRE - PHQ9
2. FEELING DOWN, DEPRESSED OR HOPELESS: NOT AT ALL
SUM OF ALL RESPONSES TO PHQ9 QUESTIONS 1 AND 2: 0
1. LITTLE INTEREST OR PLEASURE IN DOING THINGS: NOT AT ALL

## 2025-07-30 NOTE — ASSESSMENT & PLAN NOTE
-I reviewed orthopedic surgery and pain management notes and diagnostic studies.  -Can continue Gabapentin 100mg TID for pain. Patient feels like it has helped him a lot.  -He would like to hold off on injections.

## 2025-07-30 NOTE — ASSESSMENT & PLAN NOTE
-I reviewed GI notes and diagnostic studies.  -Patient had EGD, Colonoscopy and capsule studies (2025) done. No signs of active bleeding.   -Hemoglobin is stable.  -Continue to monitor.

## 2025-08-19 DIAGNOSIS — M54.16 LUMBAR RADICULOPATHY: ICD-10-CM

## 2025-08-19 RX ORDER — GABAPENTIN 100 MG/1
100 CAPSULE ORAL 3 TIMES DAILY
Qty: 90 CAPSULE | Refills: 2 | Status: SHIPPED | OUTPATIENT
Start: 2025-08-19 | End: 2025-11-17

## 2025-08-22 ENCOUNTER — TELEPHONE (OUTPATIENT)
Dept: PRIMARY CARE | Facility: CLINIC | Age: 65
End: 2025-08-22
Payer: COMMERCIAL

## 2025-11-05 ENCOUNTER — APPOINTMENT (OUTPATIENT)
Dept: PRIMARY CARE | Facility: CLINIC | Age: 65
End: 2025-11-05
Payer: COMMERCIAL